# Patient Record
Sex: FEMALE | Race: WHITE | NOT HISPANIC OR LATINO
[De-identification: names, ages, dates, MRNs, and addresses within clinical notes are randomized per-mention and may not be internally consistent; named-entity substitution may affect disease eponyms.]

---

## 2017-06-29 ENCOUNTER — APPOINTMENT (OUTPATIENT)
Dept: OPHTHALMOLOGY | Facility: CLINIC | Age: 67
End: 2017-06-29

## 2017-08-28 ENCOUNTER — APPOINTMENT (OUTPATIENT)
Dept: UROLOGY | Facility: CLINIC | Age: 67
End: 2017-08-28
Payer: MEDICARE

## 2017-08-28 VITALS
HEART RATE: 61 BPM | WEIGHT: 115 LBS | SYSTOLIC BLOOD PRESSURE: 98 MMHG | BODY MASS INDEX: 20.38 KG/M2 | HEIGHT: 63 IN | OXYGEN SATURATION: 91 % | DIASTOLIC BLOOD PRESSURE: 60 MMHG

## 2017-08-28 PROCEDURE — 99213 OFFICE O/P EST LOW 20 MIN: CPT

## 2017-08-28 RX ORDER — KETOCONAZOLE 20 MG/G
2 CREAM TOPICAL
Qty: 60 | Refills: 0 | Status: ACTIVE | COMMUNITY
Start: 2017-05-31

## 2017-08-29 LAB
APPEARANCE: CLEAR
BACTERIA: NEGATIVE
BILIRUBIN URINE: NEGATIVE
BLOOD URINE: NEGATIVE
COLOR: YELLOW
GLUCOSE QUALITATIVE U: NORMAL MG/DL
HYALINE CASTS: 1 /LPF
KETONES URINE: NEGATIVE
LEUKOCYTE ESTERASE URINE: NEGATIVE
MICROSCOPIC-UA: NORMAL
NITRITE URINE: NEGATIVE
PH URINE: 7.5
PROTEIN URINE: NEGATIVE MG/DL
RED BLOOD CELLS URINE: 4 /HPF
SPECIFIC GRAVITY URINE: 1.02
SQUAMOUS EPITHELIAL CELLS: 0 /HPF
UROBILINOGEN URINE: NORMAL MG/DL
WHITE BLOOD CELLS URINE: 1 /HPF

## 2017-11-15 ENCOUNTER — APPOINTMENT (OUTPATIENT)
Dept: CARDIOLOGY | Facility: CLINIC | Age: 67
End: 2017-11-15
Payer: MEDICARE

## 2017-11-15 ENCOUNTER — NON-APPOINTMENT (OUTPATIENT)
Age: 67
End: 2017-11-15

## 2017-11-15 VITALS
HEIGHT: 63 IN | WEIGHT: 117 LBS | DIASTOLIC BLOOD PRESSURE: 69 MMHG | TEMPERATURE: 97.9 F | OXYGEN SATURATION: 99 % | SYSTOLIC BLOOD PRESSURE: 114 MMHG | HEART RATE: 60 BPM | BODY MASS INDEX: 20.73 KG/M2

## 2017-11-15 PROCEDURE — 93000 ELECTROCARDIOGRAM COMPLETE: CPT

## 2017-11-15 PROCEDURE — 99214 OFFICE O/P EST MOD 30 MIN: CPT

## 2017-12-01 ENCOUNTER — OUTPATIENT (OUTPATIENT)
Dept: OUTPATIENT SERVICES | Facility: HOSPITAL | Age: 67
LOS: 1 days | End: 2017-12-01
Payer: MEDICARE

## 2017-12-01 DIAGNOSIS — R55 SYNCOPE AND COLLAPSE: ICD-10-CM

## 2017-12-01 PROCEDURE — 93660 TILT TABLE EVALUATION: CPT

## 2017-12-01 PROCEDURE — 93660 TILT TABLE EVALUATION: CPT | Mod: 26

## 2018-06-12 ENCOUNTER — APPOINTMENT (OUTPATIENT)
Dept: OPHTHALMOLOGY | Facility: CLINIC | Age: 68
End: 2018-06-12
Payer: MEDICARE

## 2018-06-12 DIAGNOSIS — D68.61 ANTIPHOSPHOLIPID SYNDROME: ICD-10-CM

## 2018-06-12 PROCEDURE — 92014 COMPRE OPH EXAM EST PT 1/>: CPT

## 2018-06-12 PROCEDURE — 92015 DETERMINE REFRACTIVE STATE: CPT

## 2018-07-05 ENCOUNTER — APPOINTMENT (OUTPATIENT)
Dept: UROLOGY | Facility: CLINIC | Age: 68
End: 2018-07-05
Payer: MEDICARE

## 2018-07-05 VITALS
WEIGHT: 114 LBS | BODY MASS INDEX: 20.98 KG/M2 | HEART RATE: 63 BPM | SYSTOLIC BLOOD PRESSURE: 108 MMHG | DIASTOLIC BLOOD PRESSURE: 68 MMHG | HEIGHT: 62 IN | OXYGEN SATURATION: 98 % | TEMPERATURE: 97.7 F

## 2018-07-05 PROCEDURE — 99213 OFFICE O/P EST LOW 20 MIN: CPT

## 2018-07-05 RX ORDER — FLUTICASONE PROPIONATE 50 UG/1
50 SPRAY, METERED NASAL
Qty: 16 | Refills: 0 | Status: DISCONTINUED | COMMUNITY
Start: 2018-02-28 | End: 2018-07-05

## 2018-07-05 RX ORDER — NEOMYCIN AND POLYMYXIN B SULFATES AND DEXAMETHASONE 3.5; 10000; 1 MG/G; [IU]/G; MG/G
3.5-10000-0.1 OINTMENT OPHTHALMIC
Qty: 4 | Refills: 0 | Status: DISCONTINUED | COMMUNITY
Start: 2018-04-22 | End: 2018-07-05

## 2018-07-05 RX ORDER — SUCRALFATE 1 G/10ML
1 SUSPENSION ORAL
Qty: 600 | Refills: 0 | Status: DISCONTINUED | COMMUNITY
Start: 2017-08-03 | End: 2018-07-05

## 2018-07-05 RX ORDER — OMEPRAZOLE 40 MG/1
40 CAPSULE, DELAYED RELEASE ORAL
Qty: 90 | Refills: 0 | Status: DISCONTINUED | COMMUNITY
Start: 2017-08-03 | End: 2018-07-05

## 2018-07-05 RX ORDER — AMOXICILLIN 500 MG/1
500 CAPSULE ORAL
Qty: 16 | Refills: 0 | Status: DISCONTINUED | COMMUNITY
Start: 2018-05-03 | End: 2018-07-05

## 2018-07-05 RX ORDER — BENZONATATE 200 MG/1
200 CAPSULE ORAL
Qty: 30 | Refills: 0 | Status: DISCONTINUED | COMMUNITY
Start: 2018-02-28 | End: 2018-07-05

## 2018-11-21 ENCOUNTER — APPOINTMENT (OUTPATIENT)
Dept: UROLOGY | Facility: CLINIC | Age: 68
End: 2018-11-21
Payer: MEDICARE

## 2018-11-21 VITALS
BODY MASS INDEX: 20.38 KG/M2 | RESPIRATION RATE: 16 BRPM | HEIGHT: 63 IN | HEART RATE: 55 BPM | WEIGHT: 115 LBS | DIASTOLIC BLOOD PRESSURE: 60 MMHG | OXYGEN SATURATION: 99 % | SYSTOLIC BLOOD PRESSURE: 112 MMHG

## 2018-11-21 LAB
BILIRUB UR QL STRIP: NORMAL
CLARITY UR: NORMAL
COLLECTION METHOD: NORMAL
GLUCOSE UR-MCNC: NORMAL
HCG UR QL: 0.2 EU/DL
HGB UR QL STRIP.AUTO: NORMAL
KETONES UR-MCNC: NORMAL
LEUKOCYTE ESTERASE UR QL STRIP: NORMAL
NITRITE UR QL STRIP: NORMAL
PH UR STRIP: 8.5
PROT UR STRIP-MCNC: NORMAL
SP GR UR STRIP: 1.02

## 2018-11-21 PROCEDURE — 99213 OFFICE O/P EST LOW 20 MIN: CPT | Mod: 25

## 2018-11-21 PROCEDURE — 81003 URINALYSIS AUTO W/O SCOPE: CPT | Mod: QW

## 2018-11-23 LAB — BACTERIA UR CULT: NORMAL

## 2019-01-09 ENCOUNTER — APPOINTMENT (OUTPATIENT)
Dept: CARDIOLOGY | Facility: CLINIC | Age: 69
End: 2019-01-09
Payer: MEDICARE

## 2019-01-09 ENCOUNTER — NON-APPOINTMENT (OUTPATIENT)
Age: 69
End: 2019-01-09

## 2019-01-09 VITALS
BODY MASS INDEX: 20.55 KG/M2 | WEIGHT: 116 LBS | SYSTOLIC BLOOD PRESSURE: 112 MMHG | HEART RATE: 57 BPM | OXYGEN SATURATION: 100 % | DIASTOLIC BLOOD PRESSURE: 58 MMHG

## 2019-01-09 PROCEDURE — 93000 ELECTROCARDIOGRAM COMPLETE: CPT

## 2019-01-09 PROCEDURE — 99214 OFFICE O/P EST MOD 30 MIN: CPT

## 2019-01-09 NOTE — HISTORY OF PRESENT ILLNESS
[FreeTextEntry1] : 68-year-old female with a history of palpitations and syncopal episodes of undetermined cause. She has no evidence of structural heart disease.  She is here because she notes an increase her symptoms of lightheadedness and heart racing when she stands up. The feeling usually persists for about 30 seconds and she has not passed out. She continues physically active and has no trouble at the gym, although she notes some dyspnea while climbing stairs.

## 2019-01-09 NOTE — DISCUSSION/SUMMARY
[FreeTextEntry1] : Mrs. Atkins is noting symptoms consistent with orthostatic hypotension and her systolic blood pressure fell from about 110 while sitting to 95 when she stood up and she became headed. Her heart rate did not change. Her exam was otherwise normal and an EKG is within normal limits.\par \par She's always had low blood pressure and her symptoms are apparently due to orthostatic hypotension. She will try to increase her salt and water intake to see if it improves her symptoms and call back here in a week.

## 2019-01-09 NOTE — PHYSICAL EXAM
[General Appearance - Well Developed] : well developed [Normal Appearance] : normal appearance [Well Groomed] : well groomed [General Appearance - Well Nourished] : well nourished [No Deformities] : no deformities [General Appearance - In No Acute Distress] : no acute distress [Normal Conjunctiva] : the conjunctiva exhibited no abnormalities [Eyelids - No Xanthelasma] : the eyelids demonstrated no xanthelasmas [Normal Oral Mucosa] : normal oral mucosa [No Oral Pallor] : no oral pallor [No Oral Cyanosis] : no oral cyanosis [Normal Jugular Venous A Waves Present] : normal jugular venous A waves present [Normal Jugular Venous V Waves Present] : normal jugular venous V waves present [No Jugular Venous Mcclellan A Waves] : no jugular venous mcclellan A waves [Respiration, Rhythm And Depth] : normal respiratory rhythm and effort [Exaggerated Use Of Accessory Muscles For Inspiration] : no accessory muscle use [Auscultation Breath Sounds / Voice Sounds] : lungs were clear to auscultation bilaterally [Heart Rate And Rhythm] : heart rate and rhythm were normal [Heart Sounds] : normal S1 and S2 [Edema] : no peripheral edema present [Abdomen Soft] : soft [Abdomen Tenderness] : non-tender [Abdomen Mass (___ Cm)] : no abdominal mass palpated [Abnormal Walk] : normal gait [Gait - Sufficient For Exercise Testing] : the gait was sufficient for exercise testing [Nail Clubbing] : no clubbing of the fingernails [Cyanosis, Localized] : no localized cyanosis [Petechial Hemorrhages (___cm)] : no petechial hemorrhages [Skin Color & Pigmentation] : normal skin color and pigmentation [] : no rash [No Venous Stasis] : no venous stasis [Skin Lesions] : no skin lesions [No Skin Ulcers] : no skin ulcer [No Xanthoma] : no  xanthoma was observed [Oriented To Time, Place, And Person] : oriented to person, place, and time [Affect] : the affect was normal [Mood] : the mood was normal [No Anxiety] : not feeling anxious [FreeTextEntry1] : Late systolic murmur at left sternal border and apex

## 2019-01-09 NOTE — REVIEW OF SYSTEMS
[Feeling Fatigued] : feeling fatigued [Palpitations] : palpitations [Joint Pain] : joint pain [Negative] : Heme/Lymph

## 2019-07-03 ENCOUNTER — APPOINTMENT (OUTPATIENT)
Dept: UROLOGY | Facility: CLINIC | Age: 69
End: 2019-07-03
Payer: MEDICARE

## 2019-07-03 VITALS
RESPIRATION RATE: 12 BRPM | OXYGEN SATURATION: 96 % | BODY MASS INDEX: 20.55 KG/M2 | WEIGHT: 116 LBS | SYSTOLIC BLOOD PRESSURE: 116 MMHG | HEART RATE: 57 BPM | HEIGHT: 63 IN | DIASTOLIC BLOOD PRESSURE: 72 MMHG

## 2019-07-03 PROCEDURE — 99213 OFFICE O/P EST LOW 20 MIN: CPT

## 2019-07-03 NOTE — ASSESSMENT
[FreeTextEntry1] : On physical exam, urethral mucosal prolapse looks unchanged. She could consider surgical excision once again in the future. Questions and concerns were answered. She can followup in 6 months to one year.

## 2019-07-03 NOTE — HISTORY OF PRESENT ILLNESS
[FreeTextEntry1] : She is a 68-year-old woman who is seen today in follow-up for urethral prolapse. She has not had any discomfort recently. However she seems to be irritated by Vagifem given to her by gynecologist. There is no change in urinary symptoms. There has been no bleeding or spotting.\par Previous note: after using estrogen replacement, she noted a significant difference especially from a sexual activity standpoint. She underwent excision of urethral prolapse in 11/2012. Pathology showed granulation tissue. Nocturia is 1-3 times depending on how much she drinks.

## 2019-07-03 NOTE — PHYSICAL EXAM
[General Appearance - Well Nourished] : well nourished [General Appearance - Well Developed] : well developed [Well Groomed] : well groomed [General Appearance - In No Acute Distress] : no acute distress [Normal Appearance] : normal appearance [Abdomen Tenderness] : non-tender [Abdomen Soft] : soft [Costovertebral Angle Tenderness] : no ~M costovertebral angle tenderness [Urinary Bladder Findings] : the bladder was normal on palpation [FreeTextEntry1] : MA in room, urethral prolapse at 6 o'clock position, unchanged. [Exaggerated Use Of Accessory Muscles For Inspiration] : no accessory muscle use [Respiration, Rhythm And Depth] : normal respiratory rhythm and effort [] : no respiratory distress [Affect] : the affect was normal [Oriented To Time, Place, And Person] : oriented to person, place, and time [Not Anxious] : not anxious [Mood] : the mood was normal

## 2019-09-05 ENCOUNTER — NON-APPOINTMENT (OUTPATIENT)
Age: 69
End: 2019-09-05

## 2019-09-05 ENCOUNTER — APPOINTMENT (OUTPATIENT)
Dept: OPHTHALMOLOGY | Facility: CLINIC | Age: 69
End: 2019-09-05
Payer: MEDICARE

## 2019-09-05 PROCEDURE — 92014 COMPRE OPH EXAM EST PT 1/>: CPT

## 2020-08-12 ENCOUNTER — APPOINTMENT (OUTPATIENT)
Dept: UROLOGY | Facility: CLINIC | Age: 70
End: 2020-08-12
Payer: MEDICARE

## 2020-08-12 VITALS
BODY MASS INDEX: 20.38 KG/M2 | DIASTOLIC BLOOD PRESSURE: 60 MMHG | WEIGHT: 115 LBS | SYSTOLIC BLOOD PRESSURE: 106 MMHG | HEIGHT: 63 IN | TEMPERATURE: 98.1 F

## 2020-08-12 PROCEDURE — 99213 OFFICE O/P EST LOW 20 MIN: CPT

## 2020-08-12 NOTE — ASSESSMENT
[FreeTextEntry1] : Physical examination is unchanged. Observation versus repeat excision was discussed. Urine studies will be sent. She will continue monitoring for now and followup in 6 months to one year.

## 2020-08-12 NOTE — PHYSICAL EXAM
[General Appearance - Well Developed] : well developed [General Appearance - Well Nourished] : well nourished [Normal Appearance] : normal appearance [Well Groomed] : well groomed [General Appearance - In No Acute Distress] : no acute distress [Abdomen Soft] : soft [Abdomen Tenderness] : non-tender [Costovertebral Angle Tenderness] : no ~M costovertebral angle tenderness [Urinary Bladder Findings] : the bladder was normal on palpation [FreeTextEntry1] : RN in room, urethral prolapse at 6 o'clock position, no erythema, no bleeding. [] : no respiratory distress [Respiration, Rhythm And Depth] : normal respiratory rhythm and effort [Exaggerated Use Of Accessory Muscles For Inspiration] : no accessory muscle use [Oriented To Time, Place, And Person] : oriented to person, place, and time [Affect] : the affect was normal [Mood] : the mood was normal [Not Anxious] : not anxious

## 2020-08-12 NOTE — HISTORY OF PRESENT ILLNESS
[FreeTextEntry1] : She is a 70-year-old woman who is seen today in follow-up for urethral prolapse. Sometimes she has slight dysuria. There has been no spotting of blood or other significant symptoms. She is on Vagifem.\par \par Previous note: She underwent excision of urethral prolapse in 11/2012. Pathology showed granulation tissue. Nocturia is 1-3 times depending on how much she drinks.

## 2020-08-13 LAB
APPEARANCE: ABNORMAL
BACTERIA: NEGATIVE
BILIRUBIN URINE: NEGATIVE
BLOOD URINE: NEGATIVE
COLOR: YELLOW
GLUCOSE QUALITATIVE U: NEGATIVE
HYALINE CASTS: 0 /LPF
KETONES URINE: NEGATIVE
LEUKOCYTE ESTERASE URINE: NEGATIVE
MICROSCOPIC-UA: NORMAL
NITRITE URINE: NEGATIVE
PH URINE: 8
PROTEIN URINE: NORMAL
RED BLOOD CELLS URINE: 3 /HPF
SPECIFIC GRAVITY URINE: 1.02
SQUAMOUS EPITHELIAL CELLS: 3 /HPF
UROBILINOGEN URINE: NORMAL
WHITE BLOOD CELLS URINE: 2 /HPF

## 2020-08-14 LAB — BACTERIA UR CULT: NORMAL

## 2020-08-19 ENCOUNTER — APPOINTMENT (OUTPATIENT)
Dept: CARDIOLOGY | Facility: CLINIC | Age: 70
End: 2020-08-19
Payer: MEDICARE

## 2020-08-19 ENCOUNTER — NON-APPOINTMENT (OUTPATIENT)
Age: 70
End: 2020-08-19

## 2020-08-19 VITALS
OXYGEN SATURATION: 99 % | HEART RATE: 56 BPM | WEIGHT: 116 LBS | BODY MASS INDEX: 20.55 KG/M2 | SYSTOLIC BLOOD PRESSURE: 113 MMHG | DIASTOLIC BLOOD PRESSURE: 64 MMHG | TEMPERATURE: 97.9 F | HEIGHT: 63 IN

## 2020-08-19 PROCEDURE — 93000 ELECTROCARDIOGRAM COMPLETE: CPT

## 2020-08-19 PROCEDURE — 99214 OFFICE O/P EST MOD 30 MIN: CPT

## 2020-08-19 NOTE — DISCUSSION/SUMMARY
[FreeTextEntry1] : Ms Atkins has no complaints and looks unchanged.  Her exam shows regular rhythm, low normal blood pressure, clear lungs, and I could not hear murmur today.  Her EKG shows left axis deviation which is an old finding and prominent P waves which have also been present in the past.\par \par I told her there was no real change on her EKG and that she was doing well.  She will follow-up in a year

## 2020-08-19 NOTE — REVIEW OF SYSTEMS
[Joint Pain] : joint pain [Palpitations] : palpitations [Feeling Fatigued] : feeling fatigued [Negative] : Heme/Lymph

## 2020-08-19 NOTE — PHYSICAL EXAM
[General Appearance - Well Developed] : well developed [Normal Appearance] : normal appearance [Well Groomed] : well groomed [General Appearance - Well Nourished] : well nourished [No Deformities] : no deformities [Normal Conjunctiva] : the conjunctiva exhibited no abnormalities [General Appearance - In No Acute Distress] : no acute distress [Eyelids - No Xanthelasma] : the eyelids demonstrated no xanthelasmas [Normal Oral Mucosa] : normal oral mucosa [No Oral Pallor] : no oral pallor [Normal Jugular Venous A Waves Present] : normal jugular venous A waves present [No Oral Cyanosis] : no oral cyanosis [No Jugular Venous Mcclellan A Waves] : no jugular venous mcclellan A waves [Normal Jugular Venous V Waves Present] : normal jugular venous V waves present [Respiration, Rhythm And Depth] : normal respiratory rhythm and effort [Exaggerated Use Of Accessory Muscles For Inspiration] : no accessory muscle use [Auscultation Breath Sounds / Voice Sounds] : lungs were clear to auscultation bilaterally [Heart Rate And Rhythm] : heart rate and rhythm were normal [Heart Sounds] : normal S1 and S2 [Edema] : no peripheral edema present [Abdomen Soft] : soft [Abdomen Tenderness] : non-tender [Abdomen Mass (___ Cm)] : no abdominal mass palpated [Abnormal Walk] : normal gait [Gait - Sufficient For Exercise Testing] : the gait was sufficient for exercise testing [Nail Clubbing] : no clubbing of the fingernails [Petechial Hemorrhages (___cm)] : no petechial hemorrhages [Cyanosis, Localized] : no localized cyanosis [Skin Color & Pigmentation] : normal skin color and pigmentation [] : no rash [No Venous Stasis] : no venous stasis [Skin Lesions] : no skin lesions [No Skin Ulcers] : no skin ulcer [No Xanthoma] : no  xanthoma was observed [Oriented To Time, Place, And Person] : oriented to person, place, and time [Affect] : the affect was normal [Mood] : the mood was normal [No Anxiety] : not feeling anxious

## 2020-08-19 NOTE — HISTORY OF PRESENT ILLNESS
[FreeTextEntry1] : 70-year old female with a history of vasodepressor syncope and palpitations.  She returns for follow-up after her internist told her EKG was abnormal because of left axis deviation and right atrial enlargement.  She has no symptoms and is feeling generally well.

## 2020-09-08 ENCOUNTER — APPOINTMENT (OUTPATIENT)
Dept: OPHTHALMOLOGY | Facility: CLINIC | Age: 70
End: 2020-09-08
Payer: MEDICARE

## 2020-09-08 ENCOUNTER — NON-APPOINTMENT (OUTPATIENT)
Age: 70
End: 2020-09-08

## 2020-09-08 PROCEDURE — 92014 COMPRE OPH EXAM EST PT 1/>: CPT

## 2020-10-01 ENCOUNTER — RECORD ABSTRACTING (OUTPATIENT)
Age: 70
End: 2020-10-01

## 2020-10-01 RX ORDER — OMEPRAZOLE 20 MG/1
20 CAPSULE, DELAYED RELEASE ORAL
Refills: 0 | Status: ACTIVE | COMMUNITY

## 2020-10-01 RX ORDER — ASPIRIN 81 MG/1
81 TABLET, CHEWABLE ORAL
Refills: 0 | Status: ACTIVE | COMMUNITY

## 2020-10-01 RX ORDER — ESTRADIOL 10 UG/1
10 TABLET, FILM COATED VAGINAL
Refills: 0 | Status: ACTIVE | COMMUNITY

## 2020-10-05 ENCOUNTER — APPOINTMENT (OUTPATIENT)
Dept: OTOLARYNGOLOGY | Facility: CLINIC | Age: 70
End: 2020-10-05
Payer: MEDICARE

## 2020-10-05 VITALS
SYSTOLIC BLOOD PRESSURE: 110 MMHG | WEIGHT: 115 LBS | BODY MASS INDEX: 20.38 KG/M2 | DIASTOLIC BLOOD PRESSURE: 68 MMHG | TEMPERATURE: 98.3 F | HEIGHT: 63 IN | HEART RATE: 58 BPM

## 2020-10-05 PROCEDURE — 99213 OFFICE O/P EST LOW 20 MIN: CPT

## 2020-10-05 NOTE — REVIEW OF SYSTEMS
[Itching] : itching [Seasonal Allergies] : seasonal allergies [As Noted in HPI] : as noted in HPI [Negative] : Head and Neck [FreeTextEntry9] : arthritis [FreeTextEntry1] : passing out

## 2020-10-05 NOTE — HISTORY OF PRESENT ILLNESS
[de-identified] : 70 yr old female w discomfort AD.  Similar problem in the past twice, was due to dental caries requiring root canal then extraction then implant. Dental work was completed last month. Feels better this week. \par -tinnitus, dizzy\par -hx otitis, noise exp, head trauma\par +FH father unilat deafness, then presby\par +hx TMJ, stopped gum chewing and went to PT, no more TMJ pain

## 2021-03-17 ENCOUNTER — APPOINTMENT (OUTPATIENT)
Dept: UROLOGY | Facility: CLINIC | Age: 71
End: 2021-03-17
Payer: MEDICARE

## 2021-03-17 VITALS
OXYGEN SATURATION: 99 % | WEIGHT: 115 LBS | HEIGHT: 63 IN | DIASTOLIC BLOOD PRESSURE: 71 MMHG | SYSTOLIC BLOOD PRESSURE: 113 MMHG | BODY MASS INDEX: 20.38 KG/M2 | RESPIRATION RATE: 15 BRPM | TEMPERATURE: 98.3 F | HEART RATE: 58 BPM

## 2021-03-17 PROCEDURE — 99213 OFFICE O/P EST LOW 20 MIN: CPT

## 2021-03-17 NOTE — HISTORY OF PRESENT ILLNESS
[FreeTextEntry1] : She is a 70-year-old woman who is seen today in follow-up for urethral mucosal prolapse. She is on Vagifem.  Every now and then she has slight burning after urination.  Urinalysis and urine culture were negative in August 2020.  There is no hematuria or dysuria.\par \par Previous note: She underwent excision of urethral prolapse in 11/2012. Pathology showed granulation tissue. Nocturia is 1-3 times depending on how much she drinks.

## 2021-03-17 NOTE — ASSESSMENT
[FreeTextEntry1] : Pelvic examination with urethral mucosal prolapse is unchanged from previous visit.  There is no evidence of bleeding or spotting upon examination.  Sometimes she has irritation after urination.  She is on Vagifem already.  She could continue to monitor or consider removal of it in the future again.  Risk of recurrence was discussed.

## 2021-03-17 NOTE — PHYSICAL EXAM
[General Appearance - Well Developed] : well developed [General Appearance - Well Nourished] : well nourished [Normal Appearance] : normal appearance [Well Groomed] : well groomed [General Appearance - In No Acute Distress] : no acute distress [Abdomen Soft] : soft [Abdomen Tenderness] : non-tender [Costovertebral Angle Tenderness] : no ~M costovertebral angle tenderness [Urinary Bladder Findings] : the bladder was normal on palpation [FreeTextEntry1] : MA in room, urethral prolapse at 6 o'clock position unchanged.

## 2021-03-22 ENCOUNTER — APPOINTMENT (OUTPATIENT)
Dept: SURGICAL ONCOLOGY | Facility: CLINIC | Age: 71
End: 2021-03-22
Payer: MEDICARE

## 2021-03-22 VITALS
OXYGEN SATURATION: 98 % | HEART RATE: 75 BPM | BODY MASS INDEX: 20.02 KG/M2 | SYSTOLIC BLOOD PRESSURE: 109 MMHG | WEIGHT: 113 LBS | DIASTOLIC BLOOD PRESSURE: 69 MMHG

## 2021-03-22 PROCEDURE — 99205 OFFICE O/P NEW HI 60 MIN: CPT

## 2021-03-22 NOTE — ASSESSMENT
[FreeTextEntry1] : IMP:\par 6 x 5.5 cm nonspecific large cystic lesion in the posterior right pelvis ventral to the sacrum resulting in mass effect upon the adjacent bowel with a few thin internal septations but no locally aggressive MRI characteristics. No adenopathy. \par \par \par \par PLAN:\par CT A/P \par Possible drainage of cyst

## 2021-03-22 NOTE — CONSULT LETTER
[Dear  ___] : Dear  [unfilled], [Consult Letter:] : I had the pleasure of evaluating your patient, [unfilled]. [Please see my note below.] : Please see my note below. [Consult Closing:] : Thank you very much for allowing me to participate in the care of this patient.  If you have any questions, please do not hesitate to contact me. [Sincerely,] : Sincerely, [FreeTextEntry1] : I will keep you informed of the CT results and my subsequent plans. [FreeTextEntry3] : Leobrado Mcconnell MD FACS\par Chief of Surgical Oncology\par \par

## 2021-03-22 NOTE — PHYSICAL EXAM
[Normal] : supple, no neck mass and thyroid not enlarged [Normal Groin Lymph Nodes] : normal groin lymph nodes [Normal] : oriented to person, place and time, with appropriate affect [Normal Supraclavicular Lymph Nodes] : normal supraclavicular lymph nodes [de-identified] : no masses [de-identified] : pelvic exam: Cervix still present with right pelvic fullness but no distinct mass

## 2021-03-22 NOTE — HISTORY OF PRESENT ILLNESS
[de-identified] : Ms. EDUARDO UNGER  is a 70 year  old female  presenting for an initial consultation for evaluation of a pelvic cyst, referred by Dr. William Blair. \par \par The patient has a history of arthritis, herniated discs, spinal stenosis and has been seeing Dr. Blair for many years.  She was recently experiencing lower back pain, worse with prolonged sitting, and sought care with Dr. Blair. Pt. states she underwent an MRI of the lumbar spine ~2-3 weeks ago which showed a partially visualized adnexal mass.  She was then referred for a Pelvic MRI which she completed on 3/1/2021 revealing a 6 x 5.5 cm nonspecific large cystic lesion in the posterior right pelvis ventral to the sacrum resulting in mass effect upon the adjacent bowel with a few thin internal septations but no locally aggressive MRI characteristics. No adenopathy. \par \par Pt. is with c/o ongoing intermittent lower back pain.  Denies abdominal or pelvic pain, nausea, vomiting, changes in bowel or urinary habits. Denies constitutional symptoms.  \par \par Past medical history notable for arthritis, anticardiolipin syndrome, right lattice degeneration, lupus anticoagulation disorder, orthostatic hypotension, palpitations, urethral prolapse (on Vagifem).  Past surgical history notable for , tonsillectomy, hysterectomy and shoulder surgery.   Pt. remains on ASA 81 mg daily. \par

## 2021-03-24 ENCOUNTER — RESULT REVIEW (OUTPATIENT)
Age: 71
End: 2021-03-24

## 2021-04-05 ENCOUNTER — APPOINTMENT (OUTPATIENT)
Dept: CT IMAGING | Facility: CLINIC | Age: 71
End: 2021-04-05
Payer: MEDICARE

## 2021-04-05 ENCOUNTER — OUTPATIENT (OUTPATIENT)
Dept: OUTPATIENT SERVICES | Facility: HOSPITAL | Age: 71
LOS: 1 days | End: 2021-04-05
Payer: MEDICARE

## 2021-04-05 ENCOUNTER — RESULT REVIEW (OUTPATIENT)
Age: 71
End: 2021-04-05

## 2021-04-05 DIAGNOSIS — R19.00 INTRA-ABDOMINAL AND PELVIC SWELLING, MASS AND LUMP, UNSPECIFIED SITE: ICD-10-CM

## 2021-04-05 PROCEDURE — 74177 CT ABD & PELVIS W/CONTRAST: CPT | Mod: 26,MG

## 2021-04-05 PROCEDURE — 74177 CT ABD & PELVIS W/CONTRAST: CPT

## 2021-04-05 PROCEDURE — G1004: CPT

## 2021-04-18 ENCOUNTER — OUTPATIENT (OUTPATIENT)
Dept: OUTPATIENT SERVICES | Facility: HOSPITAL | Age: 71
LOS: 1 days | End: 2021-04-18
Payer: MEDICARE

## 2021-04-18 DIAGNOSIS — Z11.52 ENCOUNTER FOR SCREENING FOR COVID-19: ICD-10-CM

## 2021-04-18 LAB — SARS-COV-2 RNA SPEC QL NAA+PROBE: SIGNIFICANT CHANGE UP

## 2021-04-18 PROCEDURE — U0003: CPT

## 2021-04-18 PROCEDURE — U0005: CPT

## 2021-04-18 PROCEDURE — C9803: CPT

## 2021-04-20 LAB
BASOPHILS # BLD AUTO: 0.07 K/UL
BASOPHILS NFR BLD AUTO: 1.4 %
BUN SERPL-MCNC: 24 MG/DL
CREAT SERPL-MCNC: 0.67 MG/DL
EOSINOPHIL # BLD AUTO: 0.3 K/UL
EOSINOPHIL NFR BLD AUTO: 6.2 %
HCT VFR BLD CALC: 39.8 %
HGB BLD-MCNC: 12.7 G/DL
IMM GRANULOCYTES NFR BLD AUTO: 0.4 %
INR PPP: 1.02 RATIO
LYMPHOCYTES # BLD AUTO: 1.46 K/UL
LYMPHOCYTES NFR BLD AUTO: 30.1 %
MAN DIFF?: NORMAL
MCHC RBC-ENTMCNC: 30.2 PG
MCHC RBC-ENTMCNC: 31.9 GM/DL
MCV RBC AUTO: 94.8 FL
MONOCYTES # BLD AUTO: 0.43 K/UL
MONOCYTES NFR BLD AUTO: 8.9 %
NEUTROPHILS # BLD AUTO: 2.57 K/UL
NEUTROPHILS NFR BLD AUTO: 53 %
PLATELET # BLD AUTO: 278 K/UL
PT BLD: 12.2 SEC
RBC # BLD: 4.2 M/UL
RBC # FLD: 13.2 %
WBC # FLD AUTO: 4.85 K/UL

## 2021-04-21 ENCOUNTER — OUTPATIENT (OUTPATIENT)
Dept: OUTPATIENT SERVICES | Facility: HOSPITAL | Age: 71
LOS: 1 days | End: 2021-04-21
Payer: MEDICARE

## 2021-04-21 ENCOUNTER — RESULT REVIEW (OUTPATIENT)
Age: 71
End: 2021-04-21

## 2021-04-21 VITALS
WEIGHT: 113.98 LBS | TEMPERATURE: 98 F | SYSTOLIC BLOOD PRESSURE: 115 MMHG | DIASTOLIC BLOOD PRESSURE: 77 MMHG | HEIGHT: 63 IN | OXYGEN SATURATION: 99 % | RESPIRATION RATE: 18 BRPM | HEART RATE: 64 BPM

## 2021-04-21 VITALS
HEART RATE: 62 BPM | SYSTOLIC BLOOD PRESSURE: 95 MMHG | TEMPERATURE: 98 F | RESPIRATION RATE: 18 BRPM | OXYGEN SATURATION: 98 % | DIASTOLIC BLOOD PRESSURE: 60 MMHG

## 2021-04-21 DIAGNOSIS — M19.012 PRIMARY OSTEOARTHRITIS, LEFT SHOULDER: Chronic | ICD-10-CM

## 2021-04-21 DIAGNOSIS — R19.00 INTRA-ABDOMINAL AND PELVIC SWELLING, MASS AND LUMP, UNSPECIFIED SITE: ICD-10-CM

## 2021-04-21 LAB
GRAM STN FLD: SIGNIFICANT CHANGE UP
SPECIMEN SOURCE: SIGNIFICANT CHANGE UP

## 2021-04-21 PROCEDURE — 87205 SMEAR GRAM STAIN: CPT

## 2021-04-21 PROCEDURE — C1729: CPT

## 2021-04-21 PROCEDURE — 77012 CT SCAN FOR NEEDLE BIOPSY: CPT

## 2021-04-21 PROCEDURE — 10160 PNXR ASPIR ABSC HMTMA BULLA: CPT

## 2021-04-21 PROCEDURE — 87075 CULTR BACTERIA EXCEPT BLOOD: CPT

## 2021-04-21 PROCEDURE — 87070 CULTURE OTHR SPECIMN AEROBIC: CPT

## 2021-04-21 PROCEDURE — 77012 CT SCAN FOR NEEDLE BIOPSY: CPT | Mod: 26

## 2021-04-21 PROCEDURE — 88173 CYTOPATH EVAL FNA REPORT: CPT

## 2021-04-21 PROCEDURE — 88173 CYTOPATH EVAL FNA REPORT: CPT | Mod: 26

## 2021-04-21 NOTE — ASU DISCHARGE PLAN (ADULT/PEDIATRIC) - NURSING INSTRUCTIONS
Please feel free to contact us at [phone] if any problems arise. After 6PM, Monday through Friday, on weekends and on holidays, please call [phone] and ask for the radiology resident on call to be paged.

## 2021-04-21 NOTE — ASU DISCHARGE PLAN (ADULT/PEDIATRIC) - "IF YOU OR YOUR GUARDIAN/FAMILY IS A SMOKER, IT IS IMPORTANT FOR YOUR HEALTH TO STOP SMOKING. PLEASE BE AWARE THAT SECOND HAND SMOKE IS ALSO HARMFUL."
Patient has appointment scheduled for 11/22/19.  Called and spoke with patient and stated this can be written at her appt.  No further questions.     Ariadna Woods RN   Statement Selected

## 2021-04-21 NOTE — ASU PATIENT PROFILE, ADULT - PSH
H/O  section  x 2 ,   H/O hysterectomy for benign disease  2002  Osteoarthritis of left shoulder    S/P D&C  many yrs ago  S/P tonsillectomy  60yrs ago

## 2021-04-21 NOTE — ASU DISCHARGE PLAN (ADULT/PEDIATRIC) - ASU DC SPECIAL INSTRUCTIONSFT
Pelvic Cyst Aspiration    Discharge Instructions  - You have had an aspiration of a pelvic cyst  - You may shower in 24 hours. No soaking or swimming until the site is completely healed.  - Keep the area covered and dry for the next 24 hours.  - Do not perform any heavy lifting for the next few days or until the site is healed.  - You may resume your normal diet.  - You may resume your normal medications however you should wait 48 hours before restarting aspirin, plavix, or blood thinners.  - It is normal to experience some pain over the site for the next few days. You may take apply ice to the area (20 minutes on, 20 minutes off) and take Tylenol for that pain. Do not take more frequently than every 6 hours and do not exceed more than 3000mg of Tylenol in a 24 hour period.    - You were given conscious sedation which may make you drowsy, therefore you need someone to stay with you until the morning following the procedure.  - Do not drive, engage in heavy lifting or strenuous activity, or drink any alcoholic beverages for the next 24 hours.   - You may resume normal activity in 24 hours.    Notify your primary physician and/or Interventional Radiology IMMEDIATELY if you experience any of the following       - Fever of 101F or 38C       - Chills or Rigors/ Shakes       - Swelling and/or Redness in the area around the biopsy site       - Worsening Pain       - Blood soaked bandages or worsening bleeding       - Lightheadedness and/or dizziness upon standing       - Chest Pain/ Tightness       - Shortness of Breath       - Difficulty walking    If you have a problem that you believe requires IMMEDIATE attention, please go to your NEAREST Emergency Room. If you believe your problem can safely wait until you speak to a physician, please call Interventional Radiology at [phone] for any concerns.    During Normal Weekday Business Hours- You can contact the Interventional Radiology department at [phone] during normal business hours via telephone.  During Evenings and Weekends- If you need to contact Interventional Radiology during off hours, do so by calling the hospital at [phone] and request to be connected to the Interventional Radiology Resident on call.

## 2021-04-21 NOTE — ASU PATIENT PROFILE, ADULT - PMH
Anticardiolipin antibody positive  12yrs ago  Arthritis  x many yrs  Basal cell carcinoma of face  11/12  Lupus anticoagulant positive  12yrs ago  Migraine  15yrs  Uterine leiomyoma  10yrs ago

## 2021-04-21 NOTE — PRE PROCEDURE NOTE - PRE PROCEDURE EVALUATION
Interventional Radiology    HPI: 70y Female with hx of positive lupus anticoagulant, anticardiolipin antibody positive, migraines, who is referred by Dr Mcconnell after recent CT revealed pelvic cyst. She presents for CT guided pelvic cyst aspiration. Pt denies fever but reports lower back pain.    Allergies: grass, molds, dust (Sneezing; Eye Irritation)      PAST MEDICAL & SURGICAL HISTORY:  Lupus anticoagulant positive  12yrs ago    Anticardiolipin antibody positive  12yrs ago    Uterine leiomyoma  10yrs ago    Basal cell carcinoma of face  11    Migraine  15yrs    Arthritis  x many yrs    S/P tonsillectomy  60yrs ago    H/O hysterectomy for benign disease  2002    S/P D&C  many yrs ago    H/O  section  x 2 ,     Osteoarthritis of left shoulder      Allergies    Drug Allergies Not Recorded  grass, molds, dust (Sneezing; Eye Irritation)    Intolerances        Medications (Abx/Cardiac/Anticoagulation/Blood Products)  Home Medications:  Aspirin Enteric Coated 81 mg oral delayed release tablet: 1 tab(s) orally 4 times a week (2021 10:13)  omeprazole 20 mg oral delayed release capsule: 1 cap(s) orally once a day (2021 10:13)  Pepcid:  (2021 10:13)  Vagifem: vaginal 3 times a week (2021 10:13)      Data:  160  51.7  T(C): 36.9  HR: 64  BP: 115/77  RR: 18  SpO2: 99%    Exam  General: No acute distress  Chest: Non labored breathing  Abdomen: Non-distended  Extremities: No swelling, warm            Pertinent labs:  21 wbc 4.85, plt 278, inr 1.02, cr .67, gfr 88    COVID-19 PCR . (21 @ 16:20)    COVID-19 PCR: NotDetec: You can help in the fight against COVID-19. [employer] may contact  you to see if you are interested in voluntarily participating in one of  our clinical trials.  Testing is performed using polymerase chain reaction (PCR) or  transcription mediated amplification (TMA). This COVID-19 (SARS-CoV-2)  nucleic acid amplification test was validated by [employer] and is  in use under the FDA Emergency Use Authorization (EUA) for clinical labs  CLIA-certified to perform high complexity testing. Test results should be  correlated with clinical presentation, patient history, and epidemiology.          Imagin/5/21 CT A/P revealed pelvic cyst    Plan: 70y Female presents for ct guided pelvic cyst aspiration  -NPO since  midnight  -last aspirin taken 21  -Risks/Benefits/alternatives explained with the patient and/or healthcare proxy and witnessed informed consent obtained once pt confirmed comprehension.    Roberta Swann [facility]- [facility]  ext [phone]  # [phone]

## 2021-04-23 DIAGNOSIS — N94.89 OTHER SPECIFIED CONDITIONS ASSOCIATED WITH FEMALE GENITAL ORGANS AND MENSTRUAL CYCLE: ICD-10-CM

## 2021-04-26 LAB
CULTURE RESULTS: SIGNIFICANT CHANGE UP
NON-GYNECOLOGICAL CYTOLOGY STUDY: SIGNIFICANT CHANGE UP
SPECIMEN SOURCE: SIGNIFICANT CHANGE UP

## 2021-07-12 ENCOUNTER — OUTPATIENT (OUTPATIENT)
Dept: OUTPATIENT SERVICES | Facility: HOSPITAL | Age: 71
LOS: 1 days | End: 2021-07-12
Payer: MEDICARE

## 2021-07-12 ENCOUNTER — RESULT REVIEW (OUTPATIENT)
Age: 71
End: 2021-07-12

## 2021-07-12 ENCOUNTER — APPOINTMENT (OUTPATIENT)
Dept: CT IMAGING | Facility: CLINIC | Age: 71
End: 2021-07-12
Payer: MEDICARE

## 2021-07-12 DIAGNOSIS — M19.012 PRIMARY OSTEOARTHRITIS, LEFT SHOULDER: Chronic | ICD-10-CM

## 2021-07-12 DIAGNOSIS — R19.00 INTRA-ABDOMINAL AND PELVIC SWELLING, MASS AND LUMP, UNSPECIFIED SITE: ICD-10-CM

## 2021-07-12 PROCEDURE — 72193 CT PELVIS W/DYE: CPT | Mod: 26,MH

## 2021-07-12 PROCEDURE — 72193 CT PELVIS W/DYE: CPT

## 2021-07-12 PROCEDURE — 82565 ASSAY OF CREATININE: CPT

## 2021-07-19 ENCOUNTER — NON-APPOINTMENT (OUTPATIENT)
Age: 71
End: 2021-07-19

## 2021-08-09 ENCOUNTER — APPOINTMENT (OUTPATIENT)
Dept: CARDIOLOGY | Facility: CLINIC | Age: 71
End: 2021-08-09
Payer: MEDICARE

## 2021-08-09 ENCOUNTER — NON-APPOINTMENT (OUTPATIENT)
Age: 71
End: 2021-08-09

## 2021-08-09 VITALS
DIASTOLIC BLOOD PRESSURE: 70 MMHG | BODY MASS INDEX: 19.84 KG/M2 | WEIGHT: 112 LBS | SYSTOLIC BLOOD PRESSURE: 90 MMHG | HEART RATE: 55 BPM | OXYGEN SATURATION: 100 %

## 2021-08-09 VITALS — SYSTOLIC BLOOD PRESSURE: 90 MMHG | DIASTOLIC BLOOD PRESSURE: 66 MMHG

## 2021-08-09 PROCEDURE — 93000 ELECTROCARDIOGRAM COMPLETE: CPT

## 2021-08-09 PROCEDURE — 99214 OFFICE O/P EST MOD 30 MIN: CPT

## 2021-08-09 NOTE — DISCUSSION/SUMMARY
[FreeTextEntry1] : Ms Adams  Continues to note some lightheadedness, but has not fainted since her visit a year ago.  Her exam shows low blood pressure of about 100/60, clear lungs, and a normal cardiac exam.  Her EKG shows left axis deviation and I reassured her this was not a new finding.\par \par She is on no medication and none was started.  I stressed the importance to her of making sure she is well-hydrated given her low blood pressure and history of syncope in the past.  She will follow-up here in a year

## 2021-08-09 NOTE — HISTORY OF PRESENT ILLNESS
[FreeTextEntry1] : 71-year-old female with a history of vasodepressive syncope and palpitations.  She has continued to note dyspnea climbing stairs, but is otherwise active and is not having palpitations.  She had an EKG done at her internist office which showed left axis deviation.  She is concerned, but this is been present in the past.

## 2021-08-16 ENCOUNTER — APPOINTMENT (OUTPATIENT)
Dept: UROLOGY | Facility: CLINIC | Age: 71
End: 2021-08-16
Payer: MEDICARE

## 2021-08-16 VITALS
DIASTOLIC BLOOD PRESSURE: 69 MMHG | BODY MASS INDEX: 19.84 KG/M2 | HEIGHT: 63 IN | SYSTOLIC BLOOD PRESSURE: 118 MMHG | WEIGHT: 112 LBS | TEMPERATURE: 97.6 F | HEART RATE: 57 BPM | RESPIRATION RATE: 15 BRPM | OXYGEN SATURATION: 98 %

## 2021-08-16 PROCEDURE — 99213 OFFICE O/P EST LOW 20 MIN: CPT

## 2021-08-16 NOTE — HISTORY OF PRESENT ILLNESS
[FreeTextEntry1] : She is a 71-year-old woman who is seen today in follow-up for urethral mucosal prolapse.  She is asymptomatic at this time.  Urinalysis by her primary care physician in August 2021 showed leukocytes and urine culture showed strep viridans.  She was told to repeat urine tests.  She is on Vagifem.  Every now and then she has slight burning after urination.  There is no hematuria or dysuria.\par \par Previous note: She underwent excision of urethral prolapse in 11/2012. Pathology showed granulation tissue. Nocturia is 1-3 times depending on how much she drinks.

## 2021-08-16 NOTE — PHYSICAL EXAM
[General Appearance - Well Developed] : well developed [Normal Appearance] : normal appearance [General Appearance - Well Nourished] : well nourished [Well Groomed] : well groomed [General Appearance - In No Acute Distress] : no acute distress [Abdomen Soft] : soft [Abdomen Tenderness] : non-tender [Costovertebral Angle Tenderness] : no ~M costovertebral angle tenderness [Urinary Bladder Findings] : the bladder was normal on palpation [FreeTextEntry1] : RN in room, small urethral prolapse at 6 o'clock position [] : no respiratory distress [Respiration, Rhythm And Depth] : normal respiratory rhythm and effort [Exaggerated Use Of Accessory Muscles For Inspiration] : no accessory muscle use

## 2021-08-17 LAB
APPEARANCE: CLEAR
BACTERIA: NEGATIVE
BILIRUBIN URINE: NEGATIVE
BLOOD URINE: NEGATIVE
COLOR: YELLOW
GLUCOSE QUALITATIVE U: NEGATIVE
HYALINE CASTS: 2 /LPF
KETONES URINE: NEGATIVE
LEUKOCYTE ESTERASE URINE: ABNORMAL
MICROSCOPIC-UA: NORMAL
NITRITE URINE: NEGATIVE
PH URINE: 7
PROTEIN URINE: NEGATIVE
RED BLOOD CELLS URINE: 3 /HPF
SPECIFIC GRAVITY URINE: 1.01
SQUAMOUS EPITHELIAL CELLS: 2 /HPF
UROBILINOGEN URINE: NORMAL
WHITE BLOOD CELLS URINE: 8 /HPF

## 2021-08-18 ENCOUNTER — NON-APPOINTMENT (OUTPATIENT)
Age: 71
End: 2021-08-18

## 2021-08-18 LAB — BACTERIA UR CULT: NORMAL

## 2021-11-18 ENCOUNTER — APPOINTMENT (OUTPATIENT)
Dept: OPHTHALMOLOGY | Facility: CLINIC | Age: 71
End: 2021-11-18
Payer: MEDICARE

## 2021-11-18 ENCOUNTER — NON-APPOINTMENT (OUTPATIENT)
Age: 71
End: 2021-11-18

## 2021-11-18 PROCEDURE — 99213 OFFICE O/P EST LOW 20 MIN: CPT

## 2022-01-10 ENCOUNTER — OUTPATIENT (OUTPATIENT)
Dept: OUTPATIENT SERVICES | Facility: HOSPITAL | Age: 72
LOS: 1 days | End: 2022-01-10
Payer: MEDICARE

## 2022-01-10 ENCOUNTER — APPOINTMENT (OUTPATIENT)
Dept: CT IMAGING | Facility: CLINIC | Age: 72
End: 2022-01-10
Payer: MEDICARE

## 2022-01-10 DIAGNOSIS — R19.00 INTRA-ABDOMINAL AND PELVIC SWELLING, MASS AND LUMP, UNSPECIFIED SITE: ICD-10-CM

## 2022-01-10 DIAGNOSIS — M19.012 PRIMARY OSTEOARTHRITIS, LEFT SHOULDER: Chronic | ICD-10-CM

## 2022-01-10 PROCEDURE — G1004: CPT

## 2022-01-10 PROCEDURE — 72193 CT PELVIS W/DYE: CPT | Mod: 26,MF

## 2022-01-10 PROCEDURE — 82565 ASSAY OF CREATININE: CPT

## 2022-01-10 PROCEDURE — 72193 CT PELVIS W/DYE: CPT | Mod: MF

## 2022-01-23 PROBLEM — H02.9 EYELID LESION: Status: RESOLVED | Noted: 2018-06-12 | Resolved: 2022-01-23

## 2022-01-23 PROBLEM — H01.003 BLEPHARITIS OF BOTH EYES, UNSPECIFIED EYELID, UNSPECIFIED TYPE: Status: RESOLVED | Noted: 2017-06-29 | Resolved: 2022-01-23

## 2022-01-23 PROBLEM — H92.01 RIGHT EAR PAIN: Status: RESOLVED | Noted: 2020-10-05 | Resolved: 2022-01-23

## 2022-01-23 PROBLEM — R93.5 ABNORMAL CT SCAN, PELVIS: Status: ACTIVE | Noted: 2022-01-23

## 2022-01-23 RX ORDER — TOBRAMYCIN AND DEXAMETHASONE 3; 1 MG/ML; MG/ML
0.3-0.1 SUSPENSION/ DROPS OPHTHALMIC
Qty: 5 | Refills: 0 | Status: DISCONTINUED | COMMUNITY
Start: 2018-06-21 | End: 2022-01-23

## 2022-01-25 ENCOUNTER — APPOINTMENT (OUTPATIENT)
Dept: INTERVENTIONAL RADIOLOGY/VASCULAR | Facility: CLINIC | Age: 72
End: 2022-01-25
Payer: MEDICARE

## 2022-01-25 DIAGNOSIS — R93.5 ABNORMAL FINDINGS ON DIAGNOSTIC IMAGING OF OTHER ABDOMINAL REGIONS, INCLUDING RETROPERITONEUM: ICD-10-CM

## 2022-01-25 DIAGNOSIS — H92.01 OTALGIA, RIGHT EAR: ICD-10-CM

## 2022-01-25 DIAGNOSIS — Z87.898 PERSONAL HISTORY OF OTHER SPECIFIED CONDITIONS: ICD-10-CM

## 2022-01-25 DIAGNOSIS — Z01.818 ENCOUNTER FOR OTHER PREPROCEDURAL EXAMINATION: ICD-10-CM

## 2022-01-25 DIAGNOSIS — H01.003 UNSPECIFIED BLEPHARITIS RIGHT EYE, UNSPECIFIED EYELID: ICD-10-CM

## 2022-01-25 DIAGNOSIS — K21.9 GASTRO-ESOPHAGEAL REFLUX DISEASE W/OUT ESOPHAGITIS: ICD-10-CM

## 2022-01-25 DIAGNOSIS — H02.9 UNSPECIFIED DISORDER OF EYELID: ICD-10-CM

## 2022-01-25 DIAGNOSIS — M19.90 UNSPECIFIED OSTEOARTHRITIS, UNSPECIFIED SITE: ICD-10-CM

## 2022-01-25 DIAGNOSIS — Z63.5 DISRUPTION OF FAMILY BY SEPARATION AND DIVORCE: ICD-10-CM

## 2022-01-25 DIAGNOSIS — H01.006 UNSPECIFIED BLEPHARITIS RIGHT EYE, UNSPECIFIED EYELID: ICD-10-CM

## 2022-01-25 DIAGNOSIS — Z86.018 PERSONAL HISTORY OF OTHER BENIGN NEOPLASM: ICD-10-CM

## 2022-01-25 PROCEDURE — 99204 OFFICE O/P NEW MOD 45 MIN: CPT | Mod: 95

## 2022-01-25 RX ORDER — TACROLIMUS 1 MG/G
0.1 OINTMENT TOPICAL
Qty: 60 | Refills: 0 | Status: DISCONTINUED | COMMUNITY
Start: 2017-01-18 | End: 2022-01-25

## 2022-01-25 RX ORDER — CALCIPOTRIENE AND BETAMETHASONE DIPROPIONATE 50; .5 UG/G; MG/G
0.005-0.064 OINTMENT TOPICAL
Refills: 0 | Status: DISCONTINUED | COMMUNITY
End: 2022-01-25

## 2022-01-25 RX ORDER — OMEPRAZOLE 40 MG/1
40 CAPSULE, DELAYED RELEASE ORAL
Refills: 0 | Status: DISCONTINUED | COMMUNITY
End: 2022-01-25

## 2022-01-25 RX ORDER — ESTRADIOL 10 UG/1
10 INSERT VAGINAL
Qty: 36 | Refills: 0 | Status: DISCONTINUED | COMMUNITY
Start: 2017-03-24 | End: 2022-01-25

## 2022-01-25 SDOH — SOCIAL STABILITY - SOCIAL INSECURITY: DISRUPTION OF FAMILY BY SEPARATION AND DIVORCE: Z63.5

## 2022-02-02 ENCOUNTER — OUTPATIENT (OUTPATIENT)
Dept: OUTPATIENT SERVICES | Facility: HOSPITAL | Age: 72
LOS: 1 days | End: 2022-02-02
Payer: MEDICARE

## 2022-02-02 DIAGNOSIS — M19.012 PRIMARY OSTEOARTHRITIS, LEFT SHOULDER: Chronic | ICD-10-CM

## 2022-02-02 DIAGNOSIS — Z11.52 ENCOUNTER FOR SCREENING FOR COVID-19: ICD-10-CM

## 2022-02-02 LAB — SARS-COV-2 RNA SPEC QL NAA+PROBE: SIGNIFICANT CHANGE UP

## 2022-02-02 PROCEDURE — U0003: CPT

## 2022-02-02 PROCEDURE — U0005: CPT

## 2022-02-02 PROCEDURE — C9803: CPT

## 2022-02-04 ENCOUNTER — APPOINTMENT (OUTPATIENT)
Dept: CT IMAGING | Facility: HOSPITAL | Age: 72
End: 2022-02-04

## 2022-02-04 ENCOUNTER — RESULT REVIEW (OUTPATIENT)
Age: 72
End: 2022-02-04

## 2022-02-04 ENCOUNTER — OUTPATIENT (OUTPATIENT)
Dept: OUTPATIENT SERVICES | Facility: HOSPITAL | Age: 72
LOS: 1 days | End: 2022-02-04
Payer: MEDICARE

## 2022-02-04 VITALS
OXYGEN SATURATION: 99 % | HEIGHT: 63 IN | HEART RATE: 57 BPM | SYSTOLIC BLOOD PRESSURE: 109 MMHG | DIASTOLIC BLOOD PRESSURE: 49 MMHG | TEMPERATURE: 98 F | WEIGHT: 115.08 LBS | RESPIRATION RATE: 18 BRPM

## 2022-02-04 VITALS
TEMPERATURE: 98 F | HEART RATE: 60 BPM | SYSTOLIC BLOOD PRESSURE: 110 MMHG | OXYGEN SATURATION: 100 % | RESPIRATION RATE: 18 BRPM | DIASTOLIC BLOOD PRESSURE: 68 MMHG

## 2022-02-04 DIAGNOSIS — M19.012 PRIMARY OSTEOARTHRITIS, LEFT SHOULDER: Chronic | ICD-10-CM

## 2022-02-04 DIAGNOSIS — R18.8 OTHER ASCITES: ICD-10-CM

## 2022-02-04 LAB
ANION GAP SERPL CALC-SCNC: 10 MMOL/L
BASOPHILS # BLD AUTO: 0.06 K/UL
BASOPHILS NFR BLD AUTO: 1.3 %
BUN SERPL-MCNC: 20 MG/DL
CALCIUM SERPL-MCNC: 9.6 MG/DL
CHLORIDE SERPL-SCNC: 104 MMOL/L
CO2 SERPL-SCNC: 28 MMOL/L
CREAT SERPL-MCNC: 0.73 MG/DL
EOSINOPHIL # BLD AUTO: 0.14 K/UL
EOSINOPHIL NFR BLD AUTO: 3 %
GLUCOSE SERPL-MCNC: 89 MG/DL
GRAM STN FLD: SIGNIFICANT CHANGE UP
HCT VFR BLD CALC: 40.6 %
HGB BLD-MCNC: 12.9 G/DL
IMM GRANULOCYTES NFR BLD AUTO: 0.2 %
INR PPP: 1.05 RATIO
LYMPHOCYTES # BLD AUTO: 1.46 K/UL
LYMPHOCYTES NFR BLD AUTO: 30.8 %
MAN DIFF?: NORMAL
MCHC RBC-ENTMCNC: 30.4 PG
MCHC RBC-ENTMCNC: 31.8 GM/DL
MCV RBC AUTO: 95.5 FL
MONOCYTES # BLD AUTO: 0.45 K/UL
MONOCYTES NFR BLD AUTO: 9.5 %
NEUTROPHILS # BLD AUTO: 2.62 K/UL
NEUTROPHILS NFR BLD AUTO: 55.2 %
PLATELET # BLD AUTO: 285 K/UL
POTASSIUM SERPL-SCNC: 4.7 MMOL/L
PT BLD: 12.4 SEC
RBC # BLD: 4.25 M/UL
RBC # FLD: 13.3 %
SODIUM SERPL-SCNC: 141 MMOL/L
SPECIMEN SOURCE: SIGNIFICANT CHANGE UP
WBC # FLD AUTO: 4.74 K/UL

## 2022-02-04 PROCEDURE — 10160 PNXR ASPIR ABSC HMTMA BULLA: CPT

## 2022-02-04 PROCEDURE — 77012 CT SCAN FOR NEEDLE BIOPSY: CPT | Mod: 26

## 2022-02-04 PROCEDURE — C1729: CPT

## 2022-02-04 PROCEDURE — 87205 SMEAR GRAM STAIN: CPT

## 2022-02-04 PROCEDURE — 87075 CULTR BACTERIA EXCEPT BLOOD: CPT

## 2022-02-04 PROCEDURE — 77012 CT SCAN FOR NEEDLE BIOPSY: CPT

## 2022-02-04 PROCEDURE — 87070 CULTURE OTHR SPECIMN AEROBIC: CPT

## 2022-02-04 RX ORDER — ESTRADIOL 4 UG/1
0 INSERT VAGINAL
Qty: 0 | Refills: 0 | DISCHARGE

## 2022-02-04 RX ORDER — ASPIRIN/CALCIUM CARB/MAGNESIUM 324 MG
1 TABLET ORAL
Qty: 0 | Refills: 0 | DISCHARGE

## 2022-02-04 RX ORDER — FAMOTIDINE 10 MG/ML
0 INJECTION INTRAVENOUS
Qty: 0 | Refills: 0 | DISCHARGE

## 2022-02-04 RX ORDER — OMEPRAZOLE 10 MG/1
1 CAPSULE, DELAYED RELEASE ORAL
Qty: 0 | Refills: 0 | DISCHARGE

## 2022-02-04 NOTE — PROCEDURE NOTE - PROCEDURE FINDINGS AND DETAILS
CT guided FNA of right pelvic cyst using 19 g sheathed needle yielded approx 80 cc of clear fluid.  Specimens sent for analysis.  Post- images - decompression of cyst with no immediate complication.  Full report to follow.

## 2022-02-04 NOTE — ASU DISCHARGE PLAN (ADULT/PEDIATRIC) - NS MD DC FALL RISK RISK
For information on Fall & Injury Prevention, visit: [email or web address] OR  [email or web address] OR  https://www.cdc.gov/steadi/patient.html

## 2022-02-04 NOTE — ASU DISCHARGE PLAN (ADULT/PEDIATRIC) - ASU DC SPECIAL INSTRUCTIONSFT
Aspiration Discharge    Discharge Instructions  - You have had a pelvic cyst aspiration.   - You may shower in 24 hours. No soaking or swimming until the site is completely healed.  - Keep the area covered and dry for the next 24 hours.  - Do not perform any heavy lifting for the next few days or until the site is healed.  - You may resume your normal diet.  - You may resume your normal medications however you should wait 24 hours before restarting aspirin, plavix, or blood thinners.  - It is normal to experience some pain over the site for the next few days. You may take apply ice to the area (20 minutes on, 20 minutes off) and take Tylenol for that pain. Do not take more frequently than every 6 hours and do not exceed more than 3000mg of Tylenol in a 24 hour period.    - You were given conscious sedation which may make you drowsy, therefore you need someone to stay with you until the morning following the procedure.  - Do not drive, engage in heavy lifting or strenuous activity, or drink any alcoholic beverages for the next 24 hours.   - You may resume normal activity in 24 hours.    Notify your primary physician and/or Interventional Radiology IMMEDIATELY if you experience any of the following       - Fever of 101F or 38C       - Chills or Rigors/ Shakes       - Swelling and/or Redness in the area around the biopsy site       - Worsening Pain       - Blood soaked bandages or worsening bleeding       - Lightheadedness and/or dizziness upon standing       - Chest Pain/ Tightness       - Shortness of Breath       - Difficulty walking    If you have a problem that you believe requires IMMEDIATE attention, please go to your NEAREST Emergency Room. If you believe your problem can safely wait until you speak to a physician, please call Interventional Radiology for any concerns.    During Normal Weekday Business Hours- You can contact the Interventional Radiology department during normal business hours via telephone.  During Evenings and Weekends- If you need to contact Interventional Radiology during off hours, do so by calling the hospital and requesting to be connected to the Interventional Radiologist on call.

## 2022-02-04 NOTE — ASU PATIENT PROFILE, ADULT - NSICDXPASTMEDICALHX_GEN_ALL_CORE_FT
PAST MEDICAL HISTORY:  Anticardiolipin antibody positive 12yrs ago    Arthritis x many yrs    Basal cell carcinoma of face 11/12    Lupus anticoagulant positive 12yrs ago    Migraine 15yrs    Uterine leiomyoma 10yrs ago

## 2022-02-04 NOTE — ASU DISCHARGE PLAN (ADULT/PEDIATRIC) - NURSING INSTRUCTIONS
Please feel free to contact us at [phone] if any problem arises. After 6PM, Monday and Friday, on weekends and on holidays, please call [phone] and ask for the radiology resident on call to be paged.

## 2022-02-04 NOTE — PRE PROCEDURE NOTE - PRE PROCEDURE EVALUATION
HPI: 71y Female with recurrent pelvic cyst. Presents for aspiration.    Allergies: grass, molds, dust (Sneezing; Eye Irritation)    Medications (Abx/Cardiac/Anticoagulation/Blood Products)      Data:    T(C): --  HR: --  BP: --  RR: --  SpO2: --    Exam  General: NAD  Chest: Nonlabored breathing  Extremities: WWP    Imaging: Reviewed    Plan:   -71y Female presents for pelvic cyst aspiration.  -Risks/Benefits/alternatives explained with the patient and/or healthcare proxy and witnessed informed consent obtained.

## 2022-02-04 NOTE — PRE PROCEDURE NOTE - ATTENDING COMMENTS
As above, discussed with Dr. Mcconnell and pt will only perform FNA at this point in time.  There have been no changes in the patient's status since the clinic visit.

## 2022-02-04 NOTE — ASU PATIENT PROFILE, ADULT - FALL HARM RISK - UNIVERSAL INTERVENTIONS
Bed in lowest position, wheels locked, appropriate side rails in place/Call bell, personal items and telephone in reach/Instruct patient to call for assistance before getting out of bed or chair/Non-slip footwear when patient is out of bed/[place] to call system/Physically safe environment - no spills, clutter or unnecessary equipment/Purposeful Proactive Rounding/Room/bathroom lighting operational, light cord in reach

## 2022-02-05 RX ORDER — ASPIRIN/CALCIUM CARB/MAGNESIUM 324 MG
1 TABLET ORAL
Qty: 0 | Refills: 0 | DISCHARGE
Start: 2022-02-05

## 2022-02-07 LAB — NON-GYNECOLOGICAL CYTOLOGY STUDY: SIGNIFICANT CHANGE UP

## 2022-02-09 LAB
CULTURE RESULTS: NO GROWTH — SIGNIFICANT CHANGE UP
SPECIMEN SOURCE: SIGNIFICANT CHANGE UP

## 2022-02-10 DIAGNOSIS — R19.09 OTHER INTRA-ABDOMINAL AND PELVIC SWELLING, MASS AND LUMP: ICD-10-CM

## 2022-02-11 ENCOUNTER — RESULT REVIEW (OUTPATIENT)
Age: 72
End: 2022-02-11

## 2022-02-11 ENCOUNTER — NON-APPOINTMENT (OUTPATIENT)
Age: 72
End: 2022-02-11

## 2022-04-28 ENCOUNTER — NON-APPOINTMENT (OUTPATIENT)
Age: 72
End: 2022-04-28

## 2022-04-28 ENCOUNTER — APPOINTMENT (OUTPATIENT)
Dept: CARDIOLOGY | Facility: CLINIC | Age: 72
End: 2022-04-28
Payer: MEDICARE

## 2022-04-28 VITALS
WEIGHT: 114 LBS | HEART RATE: 56 BPM | SYSTOLIC BLOOD PRESSURE: 110 MMHG | BODY MASS INDEX: 20.2 KG/M2 | OXYGEN SATURATION: 99 % | DIASTOLIC BLOOD PRESSURE: 78 MMHG | HEIGHT: 63 IN

## 2022-04-28 DIAGNOSIS — I95.1 ORTHOSTATIC HYPOTENSION: ICD-10-CM

## 2022-04-28 DIAGNOSIS — R94.31 ABNORMAL ELECTROCARDIOGRAM [ECG] [EKG]: ICD-10-CM

## 2022-04-28 DIAGNOSIS — R55 SYNCOPE AND COLLAPSE: ICD-10-CM

## 2022-04-28 DIAGNOSIS — R06.00 DYSPNEA, UNSPECIFIED: ICD-10-CM

## 2022-04-28 PROCEDURE — 99214 OFFICE O/P EST MOD 30 MIN: CPT

## 2022-04-28 PROCEDURE — 36415 COLL VENOUS BLD VENIPUNCTURE: CPT

## 2022-04-28 PROCEDURE — 93000 ELECTROCARDIOGRAM COMPLETE: CPT

## 2022-04-28 NOTE — HISTORY OF PRESENT ILLNESS
[FreeTextEntry1] : 71-year-old female with a history of hypertension, dyspnea on exertion, and palpitations without significant structural heart disease.  She is being seen on an urgent basis because of near syncope which is induced with physical activity.  She has had this in the past but is much more frequent and dramatic at present.  She states she feels "like I will pass out" when she climbs stairs or does any other sort of exertion.  She line dances regularly, but finds that she has to stop repeatedly during her sessions during the past month.  She is not short of breath and feels otherwise okay.

## 2022-04-28 NOTE — DISCUSSION/SUMMARY
[FreeTextEntry1] : Ms Atkins has noted an exacerbation of lightheadedness with physical activity during the past 2 months.  Her exam shows regular rhythm at a rate of about 80, normal blood pressure which does not change when she stands, clear lungs, and a normal cardiac exam.  Her EKG shows left axis deviation and is unchanged.\par \par The cause of the symptoms is unclear.  She is very concerned.  Blood work was drawn, a Zio patch was placed for a period of 3 days, and she was scheduled for stress echo.

## 2022-04-28 NOTE — REVIEW OF SYSTEMS
[Feeling Fatigued] : feeling fatigued [Dizziness] : dizziness [Negative] : Neurological [Sore Throat] : no sore throat

## 2022-05-02 LAB
ALBUMIN SERPL ELPH-MCNC: 4.3 G/DL
ALP BLD-CCNC: 49 U/L
ALT SERPL-CCNC: 14 U/L
ANION GAP SERPL CALC-SCNC: 10 MMOL/L
AST SERPL-CCNC: 23 U/L
BASOPHILS # BLD AUTO: 0.06 K/UL
BASOPHILS NFR BLD AUTO: 1.5 %
BILIRUB SERPL-MCNC: 0.2 MG/DL
BUN SERPL-MCNC: 23 MG/DL
CALCIUM SERPL-MCNC: 9.9 MG/DL
CHLORIDE SERPL-SCNC: 104 MMOL/L
CHOLEST SERPL-MCNC: 219 MG/DL
CO2 SERPL-SCNC: 26 MMOL/L
CREAT SERPL-MCNC: 0.75 MG/DL
EGFR: 85 ML/MIN/1.73M2
EOSINOPHIL # BLD AUTO: 0.15 K/UL
EOSINOPHIL NFR BLD AUTO: 3.8 %
ESTIMATED AVERAGE GLUCOSE: 120 MG/DL
GLUCOSE SERPL-MCNC: 87 MG/DL
HBA1C MFR BLD HPLC: 5.8 %
HCT VFR BLD CALC: 37.8 %
HDLC SERPL-MCNC: 68 MG/DL
HGB BLD-MCNC: 12.5 G/DL
IMM GRANULOCYTES NFR BLD AUTO: 0.5 %
LDLC SERPL CALC-MCNC: 140 MG/DL
LYMPHOCYTES # BLD AUTO: 1.4 K/UL
LYMPHOCYTES NFR BLD AUTO: 35.8 %
MAN DIFF?: NORMAL
MCHC RBC-ENTMCNC: 30.4 PG
MCHC RBC-ENTMCNC: 33.1 GM/DL
MCV RBC AUTO: 92 FL
MONOCYTES # BLD AUTO: 0.47 K/UL
MONOCYTES NFR BLD AUTO: 12 %
NEUTROPHILS # BLD AUTO: 1.81 K/UL
NEUTROPHILS NFR BLD AUTO: 46.4 %
NONHDLC SERPL-MCNC: 152 MG/DL
PLATELET # BLD AUTO: 238 K/UL
POTASSIUM SERPL-SCNC: 4.6 MMOL/L
PROT SERPL-MCNC: 6.6 G/DL
RBC # BLD: 4.11 M/UL
RBC # FLD: 13.2 %
SODIUM SERPL-SCNC: 141 MMOL/L
TRIGL SERPL-MCNC: 59 MG/DL
TSH SERPL-ACNC: 3.28 UIU/ML
WBC # FLD AUTO: 3.91 K/UL

## 2022-05-16 ENCOUNTER — APPOINTMENT (OUTPATIENT)
Dept: CT IMAGING | Facility: CLINIC | Age: 72
End: 2022-05-16

## 2022-05-27 ENCOUNTER — APPOINTMENT (OUTPATIENT)
Dept: CARDIOLOGY | Facility: CLINIC | Age: 72
End: 2022-05-27
Payer: MEDICARE

## 2022-05-27 PROCEDURE — 93306 TTE W/DOPPLER COMPLETE: CPT

## 2022-05-27 PROCEDURE — 93351 STRESS TTE COMPLETE: CPT

## 2022-05-27 PROCEDURE — 93325 DOPPLER ECHO COLOR FLOW MAPG: CPT | Mod: 59

## 2022-05-27 PROCEDURE — 93321 DOPPLER ECHO F-UP/LMTD STD: CPT | Mod: 59

## 2022-06-28 ENCOUNTER — OUTPATIENT (OUTPATIENT)
Dept: OUTPATIENT SERVICES | Facility: HOSPITAL | Age: 72
LOS: 1 days | End: 2022-06-28
Payer: MEDICARE

## 2022-06-28 ENCOUNTER — APPOINTMENT (OUTPATIENT)
Dept: CT IMAGING | Facility: CLINIC | Age: 72
End: 2022-06-28

## 2022-06-28 DIAGNOSIS — R18.8 OTHER ASCITES: ICD-10-CM

## 2022-06-28 DIAGNOSIS — M19.012 PRIMARY OSTEOARTHRITIS, LEFT SHOULDER: Chronic | ICD-10-CM

## 2022-06-28 PROCEDURE — G1004: CPT

## 2022-06-28 PROCEDURE — 72193 CT PELVIS W/DYE: CPT | Mod: 26,MF

## 2022-06-28 PROCEDURE — 72193 CT PELVIS W/DYE: CPT | Mod: MF

## 2022-07-25 ENCOUNTER — APPOINTMENT (OUTPATIENT)
Dept: SURGICAL ONCOLOGY | Facility: CLINIC | Age: 72
End: 2022-07-25

## 2022-07-25 ENCOUNTER — RESULT REVIEW (OUTPATIENT)
Age: 72
End: 2022-07-25

## 2022-07-25 VITALS
TEMPERATURE: 97.7 F | RESPIRATION RATE: 15 BRPM | HEIGHT: 63 IN | WEIGHT: 114 LBS | OXYGEN SATURATION: 98 % | DIASTOLIC BLOOD PRESSURE: 74 MMHG | HEART RATE: 58 BPM | BODY MASS INDEX: 20.2 KG/M2 | SYSTOLIC BLOOD PRESSURE: 121 MMHG

## 2022-07-25 DIAGNOSIS — R18.8 OTHER ASCITES: ICD-10-CM

## 2022-07-25 PROCEDURE — 99214 OFFICE O/P EST MOD 30 MIN: CPT

## 2022-07-25 NOTE — CONSULT LETTER
[Dear  ___] : Dear  [unfilled], [Courtesy Letter:] : I had the pleasure of seeing your patient, [unfilled], in my office today. [Please see my note below.] : Please see my note below. [Consult Closing:] : Thank you very much for allowing me to participate in the care of this patient.  If you have any questions, please do not hesitate to contact me. [Sincerely,] : Sincerely, [FreeTextEntry1] : I will keep you informed of my follow-up. [FreeTextEntry3] : Leobardo Mcconnell MD FACS\par Chief of Surgical Oncology\par \par

## 2022-07-25 NOTE — HISTORY OF PRESENT ILLNESS
[de-identified] : Ms. ROSLYN UNGER is a 71 year old woman here today for a follow-up visit for a pelvic cyst. \par \par \par The patient has a history of arthritis, herniated discs, spinal stenosis and has been seeing Dr. Blair for many years.  She was recently experiencing lower back pain, worse with prolonged sitting, and sought care with Dr. Blair. Pt. states she underwent an MRI of the lumbar spine ~2-3 weeks ago which showed a partially visualized adnexal mass.  She was then referred for a Pelvic MRI which she completed on 3/1/2021 revealing a 6 x 5.5 cm nonspecific large cystic lesion in the posterior right pelvis ventral to the sacrum resulting in mass effect upon the adjacent bowel with a few thin internal septations but no locally aggressive MRI characteristics. No adenopathy. \par \par Past medical history notable for arthritis, anticardiolipin syndrome, right lattice degeneration, lupus anticoagulation disorder, orthostatic hypotension, palpitations, urethral prolapse (on Vagifem).  Past surgical history notable for , tonsillectomy, hysterectomy and shoulder surgery.   Pt. remains on ASA 81 mg daily. \par \par She had IR drainage of the cyst 2021 with negative cytology. \par \par CT pelvis from 2022 showed increase in size of persistent pelvic cyst.\par Pelvic cyst was against drained 2022 with negative cytology. (Post procedure note stated near complete decompression of cyst) Roslyn reported pain was much better after drainage, if pain or fluid collection reoccur, may need surgery. \par \par CT pelvis 2022 : Re demonstration of a cyst identified deep in the right pelvis today measuring 5.5 x 4.3 x 5.2 cm in size, previously measuring 5.4 x 6.4 x 5.5 cm\par Denies any accompanying symptoms, denies issues with urination or bowel, denies back pain.

## 2022-07-25 NOTE — ASSESSMENT
[FreeTextEntry1] : IMP:\par 70yo F with persistent stabl;e asymptomatic pelvic cyst, \par \par CT pelvis 1/2022 showed increase of cyst\par s/p IR drainage 4/2021, negative cytology\par \par reoccurrence and drainage 2/2022, negative cytology, if pain or fluid collection reoccur, may need surgery. \par \par CT pelvis 6/2022 : Redemonstration of a cyst identified deep in the right pelvis today measuring 5.5 x 4.3 x 5.2 cm in size, previously measuring 5.4 x 6.4 x 5.5 cm\par \par \par PLAN:\par Watchful waiting\par RTO yearlyw with CT of pelvis\par RTO sooner if symptoms occur\par

## 2022-07-25 NOTE — PHYSICAL EXAM
[de-identified] : pelvic exam: Cervix still present with right pelvic fullness but no distinct mass [Normal] : supple, no neck mass and thyroid not enlarged [Normal Supraclavicular Lymph Nodes] : normal supraclavicular lymph nodes [Normal Groin Lymph Nodes] : normal groin lymph nodes [Normal] : oriented to person, place and time, with appropriate affect [de-identified] : no masses or tenderness

## 2022-08-15 ENCOUNTER — APPOINTMENT (OUTPATIENT)
Dept: CARDIOLOGY | Facility: CLINIC | Age: 72
End: 2022-08-15

## 2022-08-22 ENCOUNTER — APPOINTMENT (OUTPATIENT)
Dept: UROLOGY | Facility: CLINIC | Age: 72
End: 2022-08-22

## 2022-08-22 VITALS
HEART RATE: 55 BPM | DIASTOLIC BLOOD PRESSURE: 55 MMHG | SYSTOLIC BLOOD PRESSURE: 99 MMHG | RESPIRATION RATE: 14 BRPM | BODY MASS INDEX: 20.2 KG/M2 | WEIGHT: 114 LBS | OXYGEN SATURATION: 95 % | HEIGHT: 63 IN

## 2022-08-22 PROCEDURE — 99213 OFFICE O/P EST LOW 20 MIN: CPT

## 2022-08-22 NOTE — ASSESSMENT
[FreeTextEntry1] : Examination of the urethral mucosal prolapse at the 6 o'clock position appears to be unchanged.  There is no evidence of bleeding, discharge etc.  It has been excised once many years ago.  She will continue to monitor on annual basis.

## 2022-08-22 NOTE — PHYSICAL EXAM
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

## 2022-08-22 NOTE — HISTORY OF PRESENT ILLNESS
[FreeTextEntry1] : She is a 72 year-old woman who is seen today in follow-up for urethral mucosal prolapse.  She is voiding well.  There is no spotting or discharge.  There is no gross hematuria.  Urine culture in August 2021 was normal.  She is using Vagifem.\par \par Previous note: She underwent excision of urethral prolapse in 2012. Pathology showed granulation tissue. Nocturia is 1-3 times.

## 2022-09-16 ENCOUNTER — APPOINTMENT (OUTPATIENT)
Dept: OTOLARYNGOLOGY | Facility: CLINIC | Age: 72
End: 2022-09-16

## 2022-09-16 VITALS
DIASTOLIC BLOOD PRESSURE: 61 MMHG | SYSTOLIC BLOOD PRESSURE: 106 MMHG | BODY MASS INDEX: 20.38 KG/M2 | WEIGHT: 115 LBS | HEIGHT: 63 IN | HEART RATE: 56 BPM

## 2022-09-16 DIAGNOSIS — M26.609 UNSPECIFIED TEMPOROMANDIBULAR JOINT DISORDER: ICD-10-CM

## 2022-09-16 PROCEDURE — 99213 OFFICE O/P EST LOW 20 MIN: CPT

## 2022-09-16 NOTE — HISTORY OF PRESENT ILLNESS
[de-identified] : Roslyn Atkins is a 71 yo female who presents for evaluation of right ear and jaw pain. She was previously seen for this and found to have a dental issue requiring a root canal. This occurred two more times and on the third time, she underwent dental extraction followed by implant. About a month ago, she tugged at her right ear. She was seen by her dentist and oral surgeon, who was concerned for possible TMJ. She notes that she has intermittent right earache, along with right jaw tenderness. She denies otorrhea, tinnitus, vertigo, or hearing change. She denies recent fevers or chills. She denies facial weakness or numbness. She denies history of recurrent ear infections. She wears a retainer at night.

## 2022-09-16 NOTE — ASSESSMENT
[FreeTextEntry1] : Roslyn Atkins presents for evaluation of intermittent right otalgia and jaw pain. She has history of TMJ. Otoscopic exam is normal. The patient was told the origin of their ear pain most likely reflects TMJ. They will start a soft diet, use NSAIDs as needed, warm compresses, possible tooth guard. She has been seen by oral surgery. She has undergone TMJ PT before and would like to do that again. 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 Group home counseled regarding conditions which should prompt return.

## 2022-09-20 ENCOUNTER — APPOINTMENT (OUTPATIENT)
Dept: ORTHOPEDIC SURGERY | Facility: CLINIC | Age: 72
End: 2022-09-20

## 2022-09-20 VITALS — HEIGHT: 63 IN | BODY MASS INDEX: 20.38 KG/M2 | WEIGHT: 115 LBS

## 2022-09-20 DIAGNOSIS — M75.51 BURSITIS OF RIGHT SHOULDER: ICD-10-CM

## 2022-09-20 PROCEDURE — 99213 OFFICE O/P EST LOW 20 MIN: CPT

## 2022-09-20 PROCEDURE — 73030 X-RAY EXAM OF SHOULDER: CPT | Mod: RT

## 2022-09-20 NOTE — IMAGING
[de-identified] : Cervical spine\par Inspection normal\par Good active range of motion\par No tenderness\par -negative foraminal closing test bilaterally\par \par Right shoulder\par No swelling\par No palpable tenderness\par Full active range of motion\par Negative impingement\par Supraspinatus strength 5 minus/5\par Radial pulse 2+ [Right] : right shoulder [FreeTextEntry1] : Reviewed and interpreted.  Right shoulder external rotation and outlet views-Type I-II acromion.  Moderate degenerative changes of glenohumeral joint

## 2022-09-20 NOTE — DISCUSSION/SUMMARY
[de-identified] : Continue home exercises\par Continue PT once or twice a week at [facility] in [place]\par Ice p.r.n.\par Tylenol p.r.n.\par I discussed possible repeat Euflexxa injections if her symptoms worsen\par \par Impression:\par Moderate osteoarthritis right shoulder

## 2022-09-20 NOTE — HISTORY OF PRESENT ILLNESS
[Gradual] : gradual [5] : 5 [0] : 0 [Intermittent] : intermittent [Retired] : Work status: retired [de-identified] : The patient has continued pain and stiffness right shoulder.  Pain reaching above her and behind her.  No awakening from sleep at night.  She did have good improvement after previous Euflexxa injections in her shoulder.  She has been going to PT.  Doing home exercises.  No neck pain. [] : Post Surgical Visit: no [de-identified] : 01/23/2021 [de-identified] : Dr. Blair

## 2022-11-22 ENCOUNTER — NON-APPOINTMENT (OUTPATIENT)
Age: 72
End: 2022-11-22

## 2022-11-22 ENCOUNTER — APPOINTMENT (OUTPATIENT)
Dept: OPHTHALMOLOGY | Facility: CLINIC | Age: 72
End: 2022-11-22

## 2022-11-22 PROCEDURE — 92014 COMPRE OPH EXAM EST PT 1/>: CPT

## 2023-03-09 ENCOUNTER — APPOINTMENT (OUTPATIENT)
Dept: ORTHOPEDIC SURGERY | Facility: CLINIC | Age: 73
End: 2023-03-09
Payer: MEDICARE

## 2023-03-09 VITALS — BODY MASS INDEX: 20.38 KG/M2 | WEIGHT: 115 LBS | HEIGHT: 63 IN

## 2023-03-09 PROCEDURE — 73502 X-RAY EXAM HIP UNI 2-3 VIEWS: CPT

## 2023-03-09 PROCEDURE — 99214 OFFICE O/P EST MOD 30 MIN: CPT

## 2023-03-09 PROCEDURE — 72100 X-RAY EXAM L-S SPINE 2/3 VWS: CPT

## 2023-03-09 RX ORDER — ROSUVASTATIN CALCIUM 5 MG/1
TABLET, FILM COATED ORAL
Refills: 0 | Status: ACTIVE | COMMUNITY

## 2023-03-09 NOTE — DATA REVIEWED
[Left] : left [Lower Extremities] : lower extremities [MRI] : MRI [Lumbar Spine] : lumbar spine [I independently reviewed and interpreted images and report] : I independently reviewed and interpreted images and report [FreeTextEntry1] : MRI lumbar spine without contrast\par Date: 26.Feb.2016; Location: office\par Modifiers: -TC Technical component.\par •	29.Feb.2016 This was reviewed. There is multilevel degenerative disc disease. There is severe degenerative disc disease L3-4 through L5-S1. No significant stenosis or herniations are noted. [FreeTextEntry2] : MRI left femur done September 1, 2021 was reviewed.  There is well-circumscribed intramedullary lesion proximal femur reported as 10 mm enchondroma.\par Also reported as moderate tendinopathy and moderate grade tearing of hamstrings at their origin [FreeTextEntry3] : MRI lumbosacral spine done February 10, 2021 was reviewed.  There is multilevel degenerative disc disease.  Moderate stenosis at L2-3.  Mild stenosis at L4-5 with reactive endplate changes.\par Also reported as nonspecific cystic area ventral to visualize lower sacrum and coccyx on the right.  Partially visualized measuring 5 cm in size.  Not appreciated on prior study.  Recommend MRI of the pelvis to further evaluate

## 2023-03-09 NOTE — DISCUSSION/SUMMARY
[de-identified] : The patient will avoid irritating activities\par Moist heat p.r.n.\par Tylenol p.r.n.\par She is referred to [facility] in [place] for therapy program 2-3 times a week\par I discussed possible cortisone injection left hip if ongoing symptoms\par She return for followup in 2 months if persistent problems, otherwise routine followup in 6 months\par \par Impression:\par Lumbosacral strain/spondylosis\par Abductor tendinitis left hip

## 2023-03-09 NOTE — IMAGING
[de-identified] : Normal gait\par \par Lumbosacral spine\par Inspection normal\par Mild tenderness paraspinals left lumbosacral region\par Straight leg raising is negative bilaterally\par Motor lower extremities-normal\par \par Left hip\par No swelling\par Full painless passive range of motion\par Mild tenderness over the abductor insertion\par \par Left leg\par No swelling\par Calf is soft and nontender\par Posterior tibial pulse 2+ [FreeTextEntry1] : Reviewed and interpreted.  Lumbosacral spine AP and lateral views-severe multilevel disc space narrowing.  Mild left lumbar scoliosis [de-identified] : Reviewed and interpreted.  Pelvis AP with lateral left hip-minimal degenerative changes of the hips bilaterally.  Well-circumscribed area of increased bone density proximal femur

## 2023-03-09 NOTE — HISTORY OF PRESENT ILLNESS
Tanner Ramirez - I have prescribed differin for you.  Please apply at bedtime.  Follow up if not improving in 2 weeks.    Patric, Dr Carbajal   [Lower back] : lower back [Left Leg] : left leg [Gradual] : gradual [7] : 7 [0] : 0 [Burning] : burning [Dull/Aching] : dull/aching [Radiating] : radiating [Intermittent] : intermittent [Household chores] : household chores [Leisure] : leisure [Rest] : rest [Physical therapy] : physical therapy [Walking] : walking [Exercising] : exercising [Retired] : Work status: retired [de-identified] : The patient has had pain left lower back and left lateral hip over the past 3 weeks.  No injury.  She has mild pain in her back with change in position.  No radicular complaints.  No numbness or weakness or lower extremities\par She has pain left lateral hip.  Pain when standing and walking.  Pain after sitting and getting up.  Pain with stairs.  No awakening from sleep at night. [] : Post Surgical Visit: no [FreeTextEntry7] : L Knee  [de-identified] : 3/9/2023

## 2023-03-16 ENCOUNTER — APPOINTMENT (OUTPATIENT)
Dept: OTOLARYNGOLOGY | Facility: CLINIC | Age: 73
End: 2023-03-16
Payer: MEDICARE

## 2023-03-16 VITALS
HEART RATE: 60 BPM | SYSTOLIC BLOOD PRESSURE: 96 MMHG | HEIGHT: 63 IN | BODY MASS INDEX: 20.38 KG/M2 | WEIGHT: 115 LBS | DIASTOLIC BLOOD PRESSURE: 62 MMHG

## 2023-03-16 DIAGNOSIS — R42 DIZZINESS AND GIDDINESS: ICD-10-CM

## 2023-03-16 PROCEDURE — 92557 COMPREHENSIVE HEARING TEST: CPT

## 2023-03-16 PROCEDURE — 92567 TYMPANOMETRY: CPT

## 2023-03-16 PROCEDURE — 99214 OFFICE O/P EST MOD 30 MIN: CPT

## 2023-03-16 NOTE — HISTORY OF PRESENT ILLNESS
[de-identified] : RECURRENT DIZZY EPISODE FOR THE PAST FEW DAYS; HAD VOMITTING WITH IT\par FEELING BETTER TODAY\par MEDICAL HX REVIEWED

## 2023-03-16 NOTE — DATA REVIEWED
[de-identified] : \par -TYMPS: TYPE As AU\par -HEARING ESSENTIALLY BORDERLINE WNL/ MILD SNHL 250-8000 HZ AU (MODERATE 6-8KHZ AS)

## 2023-05-04 ENCOUNTER — NON-APPOINTMENT (OUTPATIENT)
Age: 73
End: 2023-05-04

## 2023-05-04 ENCOUNTER — APPOINTMENT (OUTPATIENT)
Dept: CARDIOLOGY | Facility: CLINIC | Age: 73
End: 2023-05-04
Payer: MEDICARE

## 2023-05-04 VITALS
OXYGEN SATURATION: 98 % | HEART RATE: 59 BPM | BODY MASS INDEX: 20.38 KG/M2 | DIASTOLIC BLOOD PRESSURE: 66 MMHG | SYSTOLIC BLOOD PRESSURE: 115 MMHG | WEIGHT: 115 LBS | HEIGHT: 63 IN

## 2023-05-04 PROCEDURE — 99214 OFFICE O/P EST MOD 30 MIN: CPT

## 2023-05-04 PROCEDURE — 93000 ELECTROCARDIOGRAM COMPLETE: CPT

## 2023-05-04 NOTE — DISCUSSION/SUMMARY
[FreeTextEntry1] : Roslyn reports episodes of dyspnea/dizziness, at the end of exertion, which sound to be blood pressure related.  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  She needs to stay hydrated.  We will see in a few weeks to see how she is feeling.  I favor eventually repeating an exercise stress test/echo to evaluate her blood pressure and LVOT gradients at the end of exertion (she did have similar testing in May 2022, though her symptoms are subdued at this time).\par \par She is willing to take Crestor every other day for her hyperlipidemia, and will have a repeat set of blood work in the office.\par \par We will speak in a few weeks to see how she is doing, and arrange follow-up.\par \par

## 2023-05-04 NOTE — PHYSICAL EXAM
[Well Developed] : well developed [Well Nourished] : well nourished [No Acute Distress] : no acute distress [Normal Conjunctiva] : normal conjunctiva [Normal Venous Pressure] : normal venous pressure [No Carotid Bruit] : no carotid bruit [Normal S1, S2] : normal S1, S2 [No Rub] : no rub [No Gallop] : no gallop [Clear Lung Fields] : clear lung fields [Good Air Entry] : good air entry [No Respiratory Distress] : no respiratory distress  [Soft] : abdomen soft [Non Tender] : non-tender [No Masses/organomegaly] : no masses/organomegaly [Normal Bowel Sounds] : normal bowel sounds [Normal Gait] : normal gait [No Edema] : no edema [No Cyanosis] : no cyanosis [No Clubbing] : no clubbing [No Varicosities] : no varicosities [No Rash] : no rash [No Skin Lesions] : no skin lesions [Moves all extremities] : moves all extremities [No Focal Deficits] : no focal deficits [Normal Speech] : normal speech [Alert and Oriented] : alert and oriented [Normal memory] : normal memory [de-identified] : + at [facility]

## 2023-05-04 NOTE — HISTORY OF PRESENT ILLNESS
[FreeTextEntry1] : Roslyn is a 72-year-old female here for a second opinion.\par \par She has a history of hypertension, and has previously been under the care of Dr. Mullins.  She last saw him in 4/2022.  She was reporting palpitations, and dyspnea on exertion, with episodes of near syncope, at the end of exertion.  The episodes have become more frequent, though did not bother her during the line dancing.  Because of her symptoms, she was set up for an echocardiogram, which was unremarkable, except for the presence of concentric LV remodeling, and moderate diastolic dysfunction.  She had a stress echocardiogram that was unremarkable.  She also had a weeklong ZIO, that demonstrated 3 episodes of SVT with the longest being 9 beats in duration.  There were rare APCs and PVCs.\par \par Her symptoms have continued, and she saw a cardiologist with [facility] earlier this year.  She had a carotid duplex which demonstrated no significant carotid disease.  Her LDL was 139.  She eventually was set up for a cardiac MRI, which demonstrated mild asymmetric hypertrophy of the basal anteroseptum/inferior/inferolateral/anterolateral segments, with a maximal thickness of 1.3 cm.  The LVEF was 66%.  The RV was normal.  There was no evidence of myocardial scar necrosis on delayed imaging.  There was evidence of a mid LV systolic outflow dynamic obstruction, due to chordal Filiberto.\par \par She was recommended to try a low-dose beta-blocker, though has been hesitant to do so.\par \par She continues to have intermittent symptoms with some dyspnea at the end of exertion, and episodes of dizziness.  She does have a history of orthostatic hypotension in the past.

## 2023-05-05 ENCOUNTER — TRANSCRIPTION ENCOUNTER (OUTPATIENT)
Age: 73
End: 2023-05-05

## 2023-05-08 LAB
ALBUMIN SERPL ELPH-MCNC: 4.4 G/DL
ALP BLD-CCNC: 48 U/L
ALT SERPL-CCNC: 18 U/L
ANION GAP SERPL CALC-SCNC: 11 MMOL/L
AST SERPL-CCNC: 23 U/L
BILIRUB SERPL-MCNC: 0.3 MG/DL
BUN SERPL-MCNC: 18 MG/DL
CALCIUM SERPL-MCNC: 9.6 MG/DL
CHLORIDE SERPL-SCNC: 104 MMOL/L
CHOLEST SERPL-MCNC: 145 MG/DL
CO2 SERPL-SCNC: 27 MMOL/L
CREAT SERPL-MCNC: 0.77 MG/DL
EGFR: 81 ML/MIN/1.73M2
GLUCOSE SERPL-MCNC: 85 MG/DL
HDLC SERPL-MCNC: 70 MG/DL
LDLC SERPL CALC-MCNC: 68 MG/DL
NONHDLC SERPL-MCNC: 76 MG/DL
POTASSIUM SERPL-SCNC: 4.8 MMOL/L
PROT SERPL-MCNC: 6.7 G/DL
SODIUM SERPL-SCNC: 142 MMOL/L
TRIGL SERPL-MCNC: 38 MG/DL

## 2023-06-05 ENCOUNTER — OUTPATIENT (OUTPATIENT)
Dept: OUTPATIENT SERVICES | Facility: HOSPITAL | Age: 73
LOS: 1 days | End: 2023-06-05
Payer: MEDICARE

## 2023-06-05 ENCOUNTER — APPOINTMENT (OUTPATIENT)
Dept: CT IMAGING | Facility: CLINIC | Age: 73
End: 2023-06-05
Payer: MEDICARE

## 2023-06-05 DIAGNOSIS — M19.012 PRIMARY OSTEOARTHRITIS, LEFT SHOULDER: Chronic | ICD-10-CM

## 2023-06-05 DIAGNOSIS — R18.8 OTHER ASCITES: ICD-10-CM

## 2023-06-05 PROCEDURE — 72193 CT PELVIS W/DYE: CPT

## 2023-06-05 PROCEDURE — 72193 CT PELVIS W/DYE: CPT | Mod: 26,MH

## 2023-07-06 ENCOUNTER — APPOINTMENT (OUTPATIENT)
Dept: CARDIOLOGY | Facility: CLINIC | Age: 73
End: 2023-07-06
Payer: MEDICARE

## 2023-07-06 ENCOUNTER — NON-APPOINTMENT (OUTPATIENT)
Age: 73
End: 2023-07-06

## 2023-07-06 VITALS
OXYGEN SATURATION: 98 % | BODY MASS INDEX: 20.38 KG/M2 | DIASTOLIC BLOOD PRESSURE: 69 MMHG | HEART RATE: 55 BPM | WEIGHT: 115 LBS | SYSTOLIC BLOOD PRESSURE: 110 MMHG | HEIGHT: 63 IN

## 2023-07-06 DIAGNOSIS — I42.2 OTHER HYPERTROPHIC CARDIOMYOPATHY: ICD-10-CM

## 2023-07-06 PROCEDURE — 99214 OFFICE O/P EST MOD 30 MIN: CPT

## 2023-07-06 PROCEDURE — 93000 ELECTROCARDIOGRAM COMPLETE: CPT

## 2023-07-06 NOTE — DISCUSSION/SUMMARY
[FreeTextEntry1] : Roslyn reports episodes of dyspnea/dizziness, at the end of exertion, which sound to be blood pressure related.  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  She needs to stay hydrated, though is not orthostatic today. She is going to have an exercise stress test to evaluate her blood pressure response to exertion and post-exertion. We will re-discuss the option of a low dose BB after test.\par \par She is willing to take Crestor every other day for her hyperlipidemia, and will have a repeat set of blood work in the office.\par \par We will speak after the above testing and arrange follow up. [EKG obtained to assist in diagnosis and management of assessed problem(s)] : EKG obtained to assist in diagnosis and management of assessed problem(s)

## 2023-07-06 NOTE — HISTORY OF PRESENT ILLNESS
[FreeTextEntry1] : Roslyn is a 72-year-old female here for follow up\par \par She has a history of hypertension, and has previously been under the care of Dr. Mullins.  She last saw him in 4/2022.  She was reporting palpitations, and dyspnea on exertion, with episodes of near syncope, at the end of exertion.  The episodes have become more frequent, though did not bother her during the line dancing.  Because of her symptoms, she was set up for an echocardiogram, which was unremarkable, except for the presence of concentric LV remodeling, and moderate diastolic dysfunction.  She had a stress echocardiogram that was unremarkable.  She also had a weeklong ZIO, that demonstrated 3 episodes of SVT with the longest being 9 beats in duration.  There were rare APCs and PVCs.\par \par Her symptoms have continued, and she saw a cardiologist with [facility] earlier this year.  She had a carotid duplex which demonstrated no significant carotid disease.  Her LDL was 139.  She eventually was set up for a cardiac MRI, which demonstrated mild asymmetric hypertrophy of the basal anteroseptum/inferior/inferolateral/anterolateral segments, with a maximal thickness of 1.3 cm.  The LVEF was 66%.  The RV was normal.  There was no evidence of myocardial scar necrosis on delayed imaging.  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 \par She continues to have intermittent symptoms with some dyspnea at the end of exertion, and episodes of dizziness.  She does have a history of orthostatic hypotension in the past.\par She went on vacation in [place] and had no issues.

## 2023-07-06 NOTE — PHYSICAL EXAM
[Well Developed] : well developed [Well Nourished] : well nourished [No Acute Distress] : no acute distress [Normal Conjunctiva] : normal conjunctiva [Normal Venous Pressure] : normal venous pressure [No Carotid Bruit] : no carotid bruit [Normal S1, S2] : normal S1, S2 [No Rub] : no rub [No Gallop] : no gallop [Clear Lung Fields] : clear lung fields [Good Air Entry] : good air entry [No Respiratory Distress] : no respiratory distress  [Soft] : abdomen soft [Non Tender] : non-tender [No Masses/organomegaly] : no masses/organomegaly [Normal Bowel Sounds] : normal bowel sounds [Normal Gait] : normal gait [No Edema] : no edema [No Cyanosis] : no cyanosis [No Clubbing] : no clubbing [No Varicosities] : no varicosities [No Rash] : no rash [No Skin Lesions] : no skin lesions [Moves all extremities] : moves all extremities [No Focal Deficits] : no focal deficits [Normal Speech] : normal speech [Alert and Oriented] : alert and oriented [Normal memory] : normal memory [de-identified] : + at [facility]

## 2023-07-07 LAB
ALBUMIN SERPL ELPH-MCNC: 4.4 G/DL
ALP BLD-CCNC: 45 U/L
ALT SERPL-CCNC: 15 U/L
ANION GAP SERPL CALC-SCNC: 9 MMOL/L
AST SERPL-CCNC: 21 U/L
BILIRUB SERPL-MCNC: 0.2 MG/DL
BUN SERPL-MCNC: 19 MG/DL
CALCIUM SERPL-MCNC: 9.4 MG/DL
CHLORIDE SERPL-SCNC: 105 MMOL/L
CHOLEST SERPL-MCNC: 170 MG/DL
CO2 SERPL-SCNC: 28 MMOL/L
CREAT SERPL-MCNC: 0.77 MG/DL
EGFR: 81 ML/MIN/1.73M2
GLUCOSE SERPL-MCNC: 87 MG/DL
HDLC SERPL-MCNC: 63 MG/DL
LDLC SERPL CALC-MCNC: 95 MG/DL
NONHDLC SERPL-MCNC: 107 MG/DL
POTASSIUM SERPL-SCNC: 4.7 MMOL/L
PROT SERPL-MCNC: 6.7 G/DL
SODIUM SERPL-SCNC: 142 MMOL/L
TRIGL SERPL-MCNC: 59 MG/DL

## 2023-07-10 ENCOUNTER — APPOINTMENT (OUTPATIENT)
Dept: SURGICAL ONCOLOGY | Facility: CLINIC | Age: 73
End: 2023-07-10
Payer: MEDICARE

## 2023-07-10 ENCOUNTER — RESULT REVIEW (OUTPATIENT)
Age: 73
End: 2023-07-10

## 2023-07-10 VITALS
HEART RATE: 65 BPM | OXYGEN SATURATION: 99 % | RESPIRATION RATE: 16 BRPM | SYSTOLIC BLOOD PRESSURE: 110 MMHG | DIASTOLIC BLOOD PRESSURE: 66 MMHG | HEIGHT: 62.5 IN | BODY MASS INDEX: 20.63 KG/M2 | WEIGHT: 115 LBS

## 2023-07-10 PROCEDURE — 99214 OFFICE O/P EST MOD 30 MIN: CPT

## 2023-07-10 NOTE — HISTORY OF PRESENT ILLNESS
[de-identified] : Ms. ROSLYN UNGER is a 72 year old woman here today for a follow-up visit for a pelvic cyst. \par \par The patient has a history of arthritis, herniated discs, spinal stenosis and has been seeing Dr. Blair for many years.  She was experiencing lower back pain, worse with prolonged sitting, and sought care with Dr. Blair. Pt. states she underwent an MRI of the lumbar spine which showed a partially visualized adnexal mass.  She was then referred for a Pelvic MRI which she completed on 3/1/2021 revealing a 6 x 5.5 cm nonspecific large cystic lesion in the posterior right pelvis ventral to the sacrum resulting in mass effect upon the adjacent bowel with a few thin internal septations but no locally aggressive MRI characteristics. No adenopathy. \par \par Past medical history notable for arthritis, anticardiolipin syndrome, right lattice degeneration, lupus anticoagulation disorder, orthostatic hypotension, palpitations, urethral prolapse (on Vagifem).  Past surgical history notable for , tonsillectomy, hysterectomy and shoulder surgery.   Pt. remains on ASA 81 mg daily. \par \par She had IR drainage of the cyst 2021 with negative cytology. \par \par CT pelvis from 2022 showed increase in size of persistent pelvic cyst.\par Pelvic cyst was against drained 2022 with negative cytology. (Post procedure note stated near complete decompression of cyst) Roslyn reported pain was much better after drainage, if pain or fluid collection reoccur, may need surgery. \par \par CT pelvis 2022 : Re demonstration of a cyst identified deep in the right pelvis today measuring 5.5 x 4.3 x 5.2 cm in size, previously measuring 5.4 x 6.4 x 5.5 cm\par Denies any accompanying symptoms, denies issues with urination or bowel, denies back pain. \par \par CT pelvis 2023- Cyst within the right lower pelvis/presacral space measuring 8.0 x 5.9 x 7.0 cm, previously measuring 5.4 x 6.4 x 5.5 cm. No nodular enhancement.\par \par I discussed with patient that we will continue to observe given that she is asymptomatic, and repeat the CT in 6 months.

## 2023-07-10 NOTE — ASSESSMENT
[FreeTextEntry1] : IMP:\par 71yo F with persistent stable asymptomatic pelvic cyst, \par \par CT pelvis 1/2022 showed increase of cyst\par s/p IR drainage 4/2021, negative cytology\par \par reoccurrence and drainage 2/2022, negative cytology, if pain or fluid collection reoccur, may need surgery. \par \par CT pelvis 6/2023 with interval enlargement of the known cyst, now measuring up to 8 cm.\par She remains asymptomatic. \par \par \par PLAN:\par Watchful waiting, RTO 6 months \par CT pelvis 12/2023 (ordered)\par RTO sooner if symptoms occur\par

## 2023-07-18 ENCOUNTER — APPOINTMENT (OUTPATIENT)
Dept: CARDIOLOGY | Facility: CLINIC | Age: 73
End: 2023-07-18
Payer: MEDICARE

## 2023-07-18 PROCEDURE — 93015 CV STRESS TEST SUPVJ I&R: CPT

## 2023-09-11 ENCOUNTER — APPOINTMENT (OUTPATIENT)
Dept: UROLOGY | Facility: CLINIC | Age: 73
End: 2023-09-11
Payer: MEDICARE

## 2023-09-11 PROCEDURE — 99213 OFFICE O/P EST LOW 20 MIN: CPT

## 2023-09-12 ENCOUNTER — APPOINTMENT (OUTPATIENT)
Dept: ORTHOPEDIC SURGERY | Facility: CLINIC | Age: 73
End: 2023-09-12
Payer: MEDICARE

## 2023-09-12 ENCOUNTER — NON-APPOINTMENT (OUTPATIENT)
Age: 73
End: 2023-09-12

## 2023-09-12 ENCOUNTER — APPOINTMENT (OUTPATIENT)
Dept: MRI IMAGING | Facility: CLINIC | Age: 73
End: 2023-09-12
Payer: MEDICARE

## 2023-09-12 VITALS — BODY MASS INDEX: 20.63 KG/M2 | WEIGHT: 115 LBS | HEIGHT: 62.5 IN

## 2023-09-12 DIAGNOSIS — N36.8 OTHER SPECIFIED DISORDERS OF URETHRA: ICD-10-CM

## 2023-09-12 DIAGNOSIS — S16.1XXA STRAIN OF MUSCLE, FASCIA AND TENDON AT NECK LEVEL, INITIAL ENCOUNTER: ICD-10-CM

## 2023-09-12 LAB
APPEARANCE: CLEAR
BACTERIA: NEGATIVE /HPF
BILIRUBIN URINE: NEGATIVE
BLOOD URINE: ABNORMAL
CAST: 0 /LPF
COLOR: YELLOW
EPITHELIAL CELLS: 2 /HPF
GLUCOSE QUALITATIVE U: NEGATIVE MG/DL
KETONES URINE: NEGATIVE MG/DL
LEUKOCYTE ESTERASE URINE: ABNORMAL
MICROSCOPIC-UA: NORMAL
NITRITE URINE: NEGATIVE
PH URINE: 7.5
PROTEIN URINE: NEGATIVE MG/DL
RED BLOOD CELLS URINE: 5 /HPF
SPECIFIC GRAVITY URINE: 1.02
UROBILINOGEN URINE: 0.2 MG/DL
WHITE BLOOD CELLS URINE: 17 /HPF

## 2023-09-12 PROCEDURE — 72141 MRI NECK SPINE W/O DYE: CPT | Mod: MH

## 2023-09-12 PROCEDURE — 72050 X-RAY EXAM NECK SPINE 4/5VWS: CPT

## 2023-09-12 PROCEDURE — 99214 OFFICE O/P EST MOD 30 MIN: CPT

## 2023-09-12 RX ORDER — FAMOTIDINE 20 MG/1
20 TABLET, FILM COATED ORAL
Qty: 90 | Refills: 0 | Status: ACTIVE | COMMUNITY
Start: 2023-04-25

## 2023-09-13 ENCOUNTER — NON-APPOINTMENT (OUTPATIENT)
Age: 73
End: 2023-09-13

## 2023-09-14 LAB — BACTERIA UR CULT: NORMAL

## 2023-09-26 ENCOUNTER — RX RENEWAL (OUTPATIENT)
Age: 73
End: 2023-09-26

## 2023-09-26 ENCOUNTER — APPOINTMENT (OUTPATIENT)
Dept: ORTHOPEDIC SURGERY | Facility: CLINIC | Age: 73
End: 2023-09-26
Payer: MEDICARE

## 2023-09-26 VITALS — WEIGHT: 115 LBS | HEIGHT: 62.5 IN | BODY MASS INDEX: 20.63 KG/M2

## 2023-09-26 DIAGNOSIS — M19.011 PRIMARY OSTEOARTHRITIS, RIGHT SHOULDER: ICD-10-CM

## 2023-09-26 DIAGNOSIS — M25.811 OTHER SPECIFIED JOINT DISORDERS, RIGHT SHOULDER: ICD-10-CM

## 2023-09-26 PROCEDURE — 99214 OFFICE O/P EST MOD 30 MIN: CPT

## 2023-09-26 PROCEDURE — 73030 X-RAY EXAM OF SHOULDER: CPT | Mod: RT

## 2023-09-26 RX ORDER — METHOCARBAMOL 500 MG/1
500 TABLET, FILM COATED ORAL
Qty: 60 | Refills: 1 | Status: COMPLETED | COMMUNITY
Start: 2023-09-12 | End: 2023-09-26

## 2023-09-26 RX ORDER — METOPROLOL SUCCINATE 25 MG/1
25 TABLET, EXTENDED RELEASE ORAL DAILY
Qty: 30 | Refills: 3 | Status: ACTIVE | COMMUNITY
Start: 2023-05-04 | End: 1900-01-01

## 2023-09-26 RX ORDER — HYDROCORTISONE 25 MG/G
2.5 OINTMENT TOPICAL
Qty: 20 | Refills: 0 | Status: COMPLETED | COMMUNITY
Start: 2017-06-09 | End: 2023-09-26

## 2023-10-05 ENCOUNTER — NON-APPOINTMENT (OUTPATIENT)
Age: 73
End: 2023-10-05

## 2023-10-05 ENCOUNTER — APPOINTMENT (OUTPATIENT)
Dept: CARDIOLOGY | Facility: CLINIC | Age: 73
End: 2023-10-05
Payer: MEDICARE

## 2023-10-05 VITALS
WEIGHT: 118 LBS | HEART RATE: 59 BPM | HEIGHT: 62.5 IN | BODY MASS INDEX: 21.17 KG/M2 | DIASTOLIC BLOOD PRESSURE: 61 MMHG | OXYGEN SATURATION: 97 % | SYSTOLIC BLOOD PRESSURE: 103 MMHG

## 2023-10-05 DIAGNOSIS — R06.00 DYSPNEA, UNSPECIFIED: ICD-10-CM

## 2023-10-05 PROCEDURE — ZZZZZ: CPT

## 2023-10-05 PROCEDURE — 99214 OFFICE O/P EST MOD 30 MIN: CPT

## 2023-10-05 PROCEDURE — 93000 ELECTROCARDIOGRAM COMPLETE: CPT

## 2023-10-18 LAB
ALBUMIN SERPL ELPH-MCNC: 4.6 G/DL
ALP BLD-CCNC: 50 U/L
ALT SERPL-CCNC: 16 U/L
ANION GAP SERPL CALC-SCNC: 6 MMOL/L
AST SERPL-CCNC: 22 U/L
BILIRUB SERPL-MCNC: 0.3 MG/DL
BUN SERPL-MCNC: 17 MG/DL
CALCIUM SERPL-MCNC: 9.4 MG/DL
CHLORIDE SERPL-SCNC: 104 MMOL/L
CHOLEST SERPL-MCNC: 159 MG/DL
CO2 SERPL-SCNC: 29 MMOL/L
CREAT SERPL-MCNC: 0.75 MG/DL
EGFR: 83 ML/MIN/1.73M2
GLUCOSE SERPL-MCNC: 84 MG/DL
HDLC SERPL-MCNC: 67 MG/DL
LDLC SERPL CALC-MCNC: 82 MG/DL
NONHDLC SERPL-MCNC: 92 MG/DL
POTASSIUM SERPL-SCNC: 4.7 MMOL/L
PROT SERPL-MCNC: 6.9 G/DL
SODIUM SERPL-SCNC: 140 MMOL/L
TRIGL SERPL-MCNC: 49 MG/DL

## 2023-10-24 ENCOUNTER — APPOINTMENT (OUTPATIENT)
Dept: CARDIOLOGY | Facility: CLINIC | Age: 73
End: 2023-10-24

## 2023-11-14 ENCOUNTER — APPOINTMENT (OUTPATIENT)
Dept: ORTHOPEDIC SURGERY | Facility: CLINIC | Age: 73
End: 2023-11-14
Payer: MEDICARE

## 2023-11-14 VITALS — WEIGHT: 118 LBS | BODY MASS INDEX: 21.17 KG/M2 | HEIGHT: 62.5 IN

## 2023-11-14 DIAGNOSIS — M18.11 UNILATERAL PRIMARY OSTEOARTHRITIS OF FIRST CARPOMETACARPAL JOINT, RIGHT HAND: ICD-10-CM

## 2023-11-14 DIAGNOSIS — S60.211A CONTUSION OF RIGHT WRIST, INITIAL ENCOUNTER: ICD-10-CM

## 2023-11-14 PROCEDURE — 99213 OFFICE O/P EST LOW 20 MIN: CPT

## 2023-11-14 PROCEDURE — 73110 X-RAY EXAM OF WRIST: CPT | Mod: RT

## 2023-11-17 LAB
APPEARANCE: CLEAR
BILIRUBIN URINE: NEGATIVE
BLOOD URINE: NEGATIVE
COLOR: YELLOW
GLUCOSE QUALITATIVE U: NEGATIVE MG/DL
KETONES URINE: NEGATIVE MG/DL
LEUKOCYTE ESTERASE URINE: NEGATIVE
NITRITE URINE: NEGATIVE
PH URINE: 7
PROTEIN URINE: NEGATIVE MG/DL
SPECIFIC GRAVITY URINE: 1.02
UROBILINOGEN URINE: 0.2 MG/DL

## 2023-11-19 LAB — BACTERIA UR CULT: NORMAL

## 2023-11-21 ENCOUNTER — APPOINTMENT (OUTPATIENT)
Dept: CARDIOLOGY | Facility: CLINIC | Age: 73
End: 2023-11-21

## 2023-11-22 ENCOUNTER — APPOINTMENT (OUTPATIENT)
Dept: ORTHOPEDIC SURGERY | Facility: CLINIC | Age: 73
End: 2023-11-22
Payer: COMMERCIAL

## 2023-11-22 VITALS — WEIGHT: 114 LBS | HEIGHT: 62.5 IN | BODY MASS INDEX: 20.45 KG/M2

## 2023-11-22 DIAGNOSIS — M51.26 OTHER INTERVERTEBRAL DISC DISPLACEMENT, LUMBAR REGION: ICD-10-CM

## 2023-11-22 DIAGNOSIS — M54.2 CERVICALGIA: ICD-10-CM

## 2023-11-22 PROCEDURE — 72110 X-RAY EXAM L-2 SPINE 4/>VWS: CPT

## 2023-11-22 PROCEDURE — 72040 X-RAY EXAM NECK SPINE 2-3 VW: CPT

## 2023-11-22 PROCEDURE — 99204 OFFICE O/P NEW MOD 45 MIN: CPT

## 2023-11-22 PROCEDURE — 72170 X-RAY EXAM OF PELVIS: CPT

## 2023-11-22 RX ORDER — TIZANIDINE 4 MG/1
4 TABLET ORAL
Qty: 40 | Refills: 0 | Status: ACTIVE | COMMUNITY
Start: 2023-11-22 | End: 1900-01-01

## 2023-12-23 ENCOUNTER — NON-APPOINTMENT (OUTPATIENT)
Age: 73
End: 2023-12-23

## 2023-12-28 ENCOUNTER — APPOINTMENT (OUTPATIENT)
Dept: CARDIOLOGY | Facility: CLINIC | Age: 73
End: 2023-12-28
Payer: MEDICARE

## 2023-12-28 ENCOUNTER — NON-APPOINTMENT (OUTPATIENT)
Age: 73
End: 2023-12-28

## 2023-12-28 VITALS
DIASTOLIC BLOOD PRESSURE: 69 MMHG | HEIGHT: 62.5 IN | OXYGEN SATURATION: 98 % | WEIGHT: 116 LBS | HEART RATE: 54 BPM | SYSTOLIC BLOOD PRESSURE: 106 MMHG | BODY MASS INDEX: 20.81 KG/M2

## 2023-12-28 DIAGNOSIS — R94.31 ABNORMAL ELECTROCARDIOGRAM [ECG] [EKG]: ICD-10-CM

## 2023-12-28 PROCEDURE — 93000 ELECTROCARDIOGRAM COMPLETE: CPT

## 2023-12-28 PROCEDURE — 99214 OFFICE O/P EST MOD 30 MIN: CPT

## 2023-12-28 NOTE — DISCUSSION/SUMMARY
Please call Dr. Renato Gibbs office tomorrow to schedule a follow-up appointment for further management and evaluation of black stools, please also call your primary care provider about this. This may include endoscopy or colonoscopy. Continue Carafate and iron as prescribed. Return to ER with any new or worsening symptoms including dizziness, lightheadedness or worsening weakness. Iron-Deficiency Anemia (Adult)  Red blood cells carry oxygen to the tissues of your body. Anemia is a condition in which you have too few red blood cells. You need iron to make red cells. Anemia makes you feel tired and run down. When anemia becomes severe, your skin becomes pale. You may feel short of breath after physical activity. Other symptoms include:  Â· Headaches  Â· Dizziness  Â· Leg cramps with physical activity  Â· Drowsiness  Your anemia is caused by not having enough iron in your body. This may be because of:  Â· Loss of blood. This can be caused by heavy menstrual periods. It can also be caused by bleeding from the stomach or intestines. Â· Poor diet. You may not be eating enough foods that contain iron. Â· Inability to absorb iron from the foods you eat  Â· Pregnancy  If your blood count is low enough, the doctor may prescribe an iron supplement. It usually takes about 2 to 3 months of treatment with iron supplements to correct an anemia. Severe cases of anemia need a blood transfusion to quickly ease symptoms and deliver more oxygen to the cells. Home care  Follow these guidelines when caring for yourself at home:  Â· Eat foods high in iron. This will boost the amount of iron stored in your body. It is a natural way to build up the number of blood cells. Good sources of iron include beef, liver, spinach and other dark green leafy vegetables, whole grains, beans, and nuts. Â· Do not overexert yourself. Â· Talk with your health care provider before traveling by air or traveling to high altitudes.   Follow-up care  Follow up with [FreeTextEntry1] : Roslyn reports episodes of dyspnea/dizziness, at the end of exertion, which sound to be blood pressure related.  A recent MRI demonstrated mild asymmetric hypertrophy of multiple segments, with evidence of a mild LV outflow tract obstruction due to to chordal Filiberto.  We had a long discussion regarding this finding.  She needs to stay hydrated, and has not been orthostatic. Her BP did not drop inappropriately during stress.  She is doing better with her current beta blocker dosing of 4x/week. This has balanced out her constipation noted at daily dosing. Her symptoms have improved, and are notably more tolerable.   She is willing to take Crestor every other day for her hyperlipidemia. your health care provider in 2 months, or as advised. This is to have another red blood cell count to be sure your anemia has been fixed. When to seek medical advice  Call your health care provider right awayÂ if any of these occur:  Â· Shortness of breath or chest pain  Â· Dizziness or fainting  Â· Vomiting blood or passing red or black-colored stoolÂ   Â© [id number] [place] [address] All rights reserved. This information is not intended as a substitute for professional medical care. Always follow your healthcare professional's instructions. [EKG obtained to assist in diagnosis and management of assessed problem(s)] : EKG obtained to assist in diagnosis and management of assessed problem(s)

## 2023-12-28 NOTE — PHYSICAL EXAM
[Well Developed] : well developed [Well Nourished] : well nourished [No Acute Distress] : no acute distress [Normal Conjunctiva] : normal conjunctiva [Normal Venous Pressure] : normal venous pressure [No Carotid Bruit] : no carotid bruit [Normal S1, S2] : normal S1, S2 [No Rub] : no rub [No Gallop] : no gallop [Clear Lung Fields] : clear lung fields [Good Air Entry] : good air entry [No Respiratory Distress] : no respiratory distress  [Soft] : abdomen soft [Non Tender] : non-tender [No Masses/organomegaly] : no masses/organomegaly [Normal Bowel Sounds] : normal bowel sounds [Normal Gait] : normal gait [No Edema] : no edema [No Cyanosis] : no cyanosis [No Clubbing] : no clubbing [No Varicosities] : no varicosities [No Rash] : no rash [No Skin Lesions] : no skin lesions [Moves all extremities] : moves all extremities [No Focal Deficits] : no focal deficits [Normal Speech] : normal speech [Alert and Oriented] : alert and oriented [Normal memory] : normal memory [de-identified] : + at [facility]

## 2023-12-28 NOTE — HISTORY OF PRESENT ILLNESS
[FreeTextEntry1] : Roslyn is a 73-year-old female here for follow up  She has a history of hypertension, and has previously been under the care of Dr. Mullins.  She last saw him in 4/2022.  She was reporting palpitations, and dyspnea on exertion, with episodes of near syncope, at the end of exertion.  The episodes have become more frequent, though did not bother her during the line dancing.  Because of her symptoms, she was set up for an echocardiogram, which was unremarkable, except for the presence of concentric LV remodeling, and moderate diastolic dysfunction.  She had a stress echocardiogram that was unremarkable.  She also had a weeklong ZIO, that demonstrated 3 episodes of SVT with the longest being 9 beats in duration.  There were rare APCs and PVCs.  Her symptoms have continued, and she saw a cardiologist with [facility] earlier this year.  She had a carotid duplex which demonstrated no significant carotid disease.  Her LDL was 139.  She eventually was set up for a cardiac MRI, which demonstrated mild asymmetric hypertrophy of the basal anteroseptum/inferior/inferolateral/anterolateral segments, with a maximal thickness of 1.3 cm.  The LVEF was 66%.  The RV was normal.  There was no evidence of myocardial scar necrosis on delayed imaging.  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 She was recommended to try a low-dose beta-blocker, though has been hesitant to do so.  I last saw her in Oct 2023 . At last visit, I set her up for an exercise stress test, during which she exercised for over 9 minutes, without worrisome EKG changes.  Her blood pressure went up appropriately.  She did have two 6 beat episodes of SVT. She agreed to start low-dose metoprolol, which has initially made her feel better. She reports notable constipation since starting this.  Overall, she is doing ok though continues to have intermittent symptoms with some dyspnea at the end of exertion, and episodes of dizziness.  She does have a history of orthostatic hypotension in the past. Her LDL was 72, taking statin every other day. She is taking Toprol XL 12.5 4x/week. This seems to have helped her constipation (versus taking it daily).

## 2024-01-02 ENCOUNTER — APPOINTMENT (OUTPATIENT)
Dept: OPHTHALMOLOGY | Facility: CLINIC | Age: 74
End: 2024-01-02
Payer: MEDICARE

## 2024-01-02 ENCOUNTER — NON-APPOINTMENT (OUTPATIENT)
Age: 74
End: 2024-01-02

## 2024-01-02 PROCEDURE — 92014 COMPRE OPH EXAM EST PT 1/>: CPT

## 2024-01-22 ENCOUNTER — APPOINTMENT (OUTPATIENT)
Dept: ORTHOPEDIC SURGERY | Facility: CLINIC | Age: 74
End: 2024-01-22
Payer: MEDICARE

## 2024-01-22 VITALS — HEIGHT: 62.5 IN | BODY MASS INDEX: 20.81 KG/M2 | WEIGHT: 116 LBS

## 2024-01-22 PROCEDURE — 99213 OFFICE O/P EST LOW 20 MIN: CPT

## 2024-01-22 NOTE — HISTORY OF PRESENT ILLNESS
[Gradual] : gradual [3] : 3 [Dull/Aching] : dull/aching [Intermittent] : intermittent [Physical therapy] : physical therapy [de-identified] : The patient has been going to physical therapy.  Doing home exercises.  She has mild pain and stiffness in her neck with movement.  No radicular complaints.  No awakening from sleep at night She has mild occasional pain in her lower back with change in position.  Pain when bending and sitting.  Pain when standing and walking.  No radicular complaints No numbness or weakness in upper or lower extremities. [] : Post Surgical Visit: no [de-identified] : Dr. Blair [de-identified] : 9/26/23 [de-identified] : 1/18/24

## 2024-01-22 NOTE — IMAGING
[de-identified] : Cervical spine Inspection normal Mild tenderness trapezius on the right Mildly decreased active range of motion Negative foraminal closing test bilaterally Motor exam upper extremities-normal  Shoulders Full painless active range of motion Radial pulse 2+ bilaterally  Normal gait  Lumbosacral spine Inspection-normal Tenderness-mild tenderness paraspinals bilaterally Straight leg raising-negative bilaterally Motor exam lower extremities-normal  Right hip Full painless passive range of motion. No tenderness  Left hip Full painless passive range of motion. No tenderness  Both legs No swelling Calves are soft and nontender Posterior tibial pulse 2+ bilaterally

## 2024-01-22 NOTE — DISCUSSION/SUMMARY
[de-identified] : Tylenol as needed.  Unable to take NSAIDs because of sensitive stomach and history of ulcers Methocarbamol 500 mg 1-2 every 8 hours as needed pain/spasm.  Not to take this if she is driving and needs to be alert Continue PT at [facility] Moist heat prn  Impression: Cervical strain/spondylosis Lumbosacral strain/spondylosis [Medication Risks Reviewed] : Medication risks reviewed

## 2024-01-29 ENCOUNTER — OUTPATIENT (OUTPATIENT)
Dept: OUTPATIENT SERVICES | Facility: HOSPITAL | Age: 74
LOS: 1 days | End: 2024-01-29
Payer: MEDICARE

## 2024-01-29 ENCOUNTER — APPOINTMENT (OUTPATIENT)
Dept: CT IMAGING | Facility: CLINIC | Age: 74
End: 2024-01-29
Payer: MEDICARE

## 2024-01-29 DIAGNOSIS — R19.00 INTRA-ABDOMINAL AND PELVIC SWELLING, MASS AND LUMP, UNSPECIFIED SITE: ICD-10-CM

## 2024-01-29 DIAGNOSIS — M19.012 PRIMARY OSTEOARTHRITIS, LEFT SHOULDER: Chronic | ICD-10-CM

## 2024-01-29 PROCEDURE — 72193 CT PELVIS W/DYE: CPT | Mod: 26,MH

## 2024-01-29 PROCEDURE — 72193 CT PELVIS W/DYE: CPT

## 2024-02-05 ENCOUNTER — NON-APPOINTMENT (OUTPATIENT)
Age: 74
End: 2024-02-05

## 2024-02-09 ENCOUNTER — APPOINTMENT (OUTPATIENT)
Dept: SURGICAL ONCOLOGY | Facility: CLINIC | Age: 74
End: 2024-02-09
Payer: MEDICARE

## 2024-02-09 ENCOUNTER — APPOINTMENT (OUTPATIENT)
Dept: CARDIOLOGY | Facility: CLINIC | Age: 74
End: 2024-02-09

## 2024-02-09 VITALS
HEART RATE: 64 BPM | SYSTOLIC BLOOD PRESSURE: 115 MMHG | RESPIRATION RATE: 16 BRPM | OXYGEN SATURATION: 99 % | BODY MASS INDEX: 20.45 KG/M2 | WEIGHT: 114 LBS | DIASTOLIC BLOOD PRESSURE: 60 MMHG | HEIGHT: 62.5 IN

## 2024-02-09 DIAGNOSIS — R19.00 INTRA-ABDOMINAL AND PELVIC SWELLING, MASS AND LUMP, UNSPECIFIED SITE: ICD-10-CM

## 2024-02-09 PROCEDURE — 99214 OFFICE O/P EST MOD 30 MIN: CPT

## 2024-02-09 NOTE — HISTORY OF PRESENT ILLNESS
[de-identified] : Ms. ROSLYN UNGER is a 73 year old woman here today for a follow-up visit for a pelvic cyst.   The patient has a history of arthritis, herniated discs, spinal stenosis and has been seeing Dr. Blair for many years.  She was experiencing lower back pain, worse with prolonged sitting, and sought care with Dr. Blair. Pt. states she underwent an MRI of the lumbar spine which showed a partially visualized adnexal mass.  She was then referred for a Pelvic MRI which she completed on 3/1/2021 revealing a 6 x 5.5 cm nonspecific large cystic lesion in the posterior right pelvis ventral to the sacrum resulting in mass effect upon the adjacent bowel with a few thin internal septations but no locally aggressive MRI characteristics. No adenopathy.   Past medical history notable for arthritis, anticardiolipin syndrome, right lattice degeneration, lupus anticoagulation disorder, orthostatic hypotension, palpitations, urethral prolapse (on Vagifem).  Past surgical history notable for , tonsillectomy, hysterectomy and shoulder surgery.   Pt. remains on ASA 81 mg daily.   She had IR drainage of the cyst 2021 with negative cytology.   CT pelvis from 2022 showed increase in size of persistent pelvic cyst. Pelvic cyst was against drained 2022 with negative cytology. (Post procedure note stated near complete decompression of cyst) Roslyn reported pain was much better after drainage, if pain or fluid collection reoccur, may need surgery.   CT pelvis 2022 : Re demonstration of a cyst identified deep in the right pelvis today measuring 5.5 x 4.3 x 5.2 cm in size, previously measuring 5.4 x 6.4 x 5.5 cm Denies any accompanying symptoms, denies issues with urination or bowel, denies back pain.   CT pelvis 2023- Cyst within the right lower pelvis/presacral space measuring 8.0 x 5.9 x 7.0 cm, previously measuring 5.4 x 6.4 x 5.5 cm. No nodular enhancement.  CT pelvis 24: Interval slight increase in size of a right lower pelvis/presacral cyst, now measuring up to 8.4 cm x 7.5, previously measuring 8.0 x 6.1 cm.  some urinary frequency and mild irregular bowel habits but not enough to be troublesome

## 2024-02-09 NOTE — ASSESSMENT
[FreeTextEntry1] : IMP: 73yo F with persistent stable asymptomatic pelvic cyst,   CT pelvis 1/2022 showed increase of cyst s/p IR drainage 4/2021, negative cytology  reoccurrence and drainage 2/2022, negative cytology, if pain or fluid collection reoccur, may need surgery.   CT pelvis 6/2023 with interval enlargement of the known cyst, now measuring up to 8 cm. She remains asymptomatic.  CT pelvis 1/29/24: Interval slight increase in size of a right lower pelvis/presacral cyst, now measuring up to 8.4 cm x 7.5, previously measuring 8.0 x 6.1 cm. Pancreatic cysts   PLAN: no surgery at this time RTO 6 months with repeat CT MRI now of pancreatic cysts

## 2024-02-09 NOTE — PHYSICAL EXAM
[Normal] : supple, no neck mass and thyroid not enlarged [Normal Supraclavicular Lymph Nodes] : normal supraclavicular lymph nodes [Normal Groin Lymph Nodes] : normal groin lymph nodes [Normal] : oriented to person, place and time, with appropriate affect [de-identified] : pelvic fullness but no true mass palpable

## 2024-03-12 ENCOUNTER — NON-APPOINTMENT (OUTPATIENT)
Age: 74
End: 2024-03-12

## 2024-03-14 DIAGNOSIS — M47.816 SPONDYLOSIS W/OUT MYELOPATHY OR RADICULOPATHY, LUMBAR REGION: ICD-10-CM

## 2024-03-26 NOTE — ASU PATIENT PROFILE, ADULT - NS TRANSFER EYEGLASSES PAIRS
1 pair

## 2024-03-28 ENCOUNTER — NON-APPOINTMENT (OUTPATIENT)
Age: 74
End: 2024-03-28

## 2024-03-29 ENCOUNTER — OUTPATIENT (OUTPATIENT)
Dept: OUTPATIENT SERVICES | Facility: HOSPITAL | Age: 74
LOS: 1 days | End: 2024-03-29
Payer: MEDICARE

## 2024-03-29 DIAGNOSIS — R09.89 OTHER SPECIFIED SYMPTOMS AND SIGNS INVOLVING THE CIRCULATORY AND RESPIRATORY SYSTEMS: ICD-10-CM

## 2024-03-29 DIAGNOSIS — M19.012 PRIMARY OSTEOARTHRITIS, LEFT SHOULDER: Chronic | ICD-10-CM

## 2024-03-29 DIAGNOSIS — R13.10 DYSPHAGIA, UNSPECIFIED: ICD-10-CM

## 2024-03-29 PROCEDURE — A9698: CPT

## 2024-03-29 PROCEDURE — 74230 X-RAY XM SWLNG FUNCJ C+: CPT | Mod: 26

## 2024-03-29 PROCEDURE — 74230 X-RAY XM SWLNG FUNCJ C+: CPT

## 2024-03-29 PROCEDURE — 92611 MOTION FLUOROSCOPY/SWALLOW: CPT

## 2024-04-15 ENCOUNTER — NON-APPOINTMENT (OUTPATIENT)
Age: 74
End: 2024-04-15

## 2024-04-15 ENCOUNTER — APPOINTMENT (OUTPATIENT)
Dept: CARDIOLOGY | Facility: CLINIC | Age: 74
End: 2024-04-15
Payer: MEDICARE

## 2024-04-15 VITALS
BODY MASS INDEX: 20.38 KG/M2 | DIASTOLIC BLOOD PRESSURE: 57 MMHG | HEART RATE: 55 BPM | WEIGHT: 115 LBS | OXYGEN SATURATION: 96 % | HEIGHT: 63 IN | SYSTOLIC BLOOD PRESSURE: 93 MMHG

## 2024-04-15 VITALS — SYSTOLIC BLOOD PRESSURE: 105 MMHG | DIASTOLIC BLOOD PRESSURE: 62 MMHG

## 2024-04-15 DIAGNOSIS — R00.2 PALPITATIONS: ICD-10-CM

## 2024-04-15 DIAGNOSIS — R42 DIZZINESS AND GIDDINESS: ICD-10-CM

## 2024-04-15 PROCEDURE — 99214 OFFICE O/P EST MOD 30 MIN: CPT

## 2024-04-15 PROCEDURE — 93000 ELECTROCARDIOGRAM COMPLETE: CPT

## 2024-04-15 NOTE — HISTORY OF PRESENT ILLNESS
[FreeTextEntry1] : Roslyn is a 73-year-old female here for follow up  She has a history of hypertension, and has previously been under the care of Dr. Mullins.  She last saw him in 4/2022.  She was reporting palpitations, and dyspnea on exertion, with episodes of near syncope, at the end of exertion.  The episodes have become more frequent, though did not bother her during the line dancing.  Because of her symptoms, she was set up for an echocardiogram, which was unremarkable, except for the presence of concentric LV remodeling, and moderate diastolic dysfunction.  She had a stress echocardiogram that was unremarkable.  She also had a weeklong ZIO, that demonstrated 3 episodes of SVT with the longest being 9 beats in duration.  There were rare APCs and PVCs.  Her symptoms have continued, and she saw a cardiologist with [facility] earlier this year.  She had a carotid duplex which demonstrated no significant carotid disease.  Her LDL was 139.  She eventually was set up for a cardiac MRI, which demonstrated mild asymmetric hypertrophy of the basal anteroseptum/inferior/inferolateral/anterolateral segments, with a maximal thickness of 1.3 cm.  The LVEF was 66%.  The RV was normal.  There was no evidence of myocardial scar necrosis on delayed imaging.  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 She was recommended to try a low-dose beta-blocker, though has been hesitant to do so.  I last saw her in  Dec 2023 . In 2023, I set her up for an exercise stress test, during which she exercised for over 9 minutes, without worrisome EKG changes.  Her blood pressure went up appropriately.  She did have two 6 beat episodes of SVT. She agreed to start low-dose metoprolol, which has initially made her feel better. She reports notable constipation since starting this.  Overall, she is doing ok though continues to have intermittent symptoms with some dyspnea at the end of exertion, and episodes of dizziness.  She does have a history of orthostatic hypotension in the past. Her LDL was 72, taking statin every other day. She is taking Toprol XL 12.5 4x/week. This seems to have helped her constipation (versus taking it daily).

## 2024-04-15 NOTE — PHYSICAL EXAM
[Well Developed] : well developed [Well Nourished] : well nourished [No Acute Distress] : no acute distress [Normal Conjunctiva] : normal conjunctiva [Normal Venous Pressure] : normal venous pressure [No Carotid Bruit] : no carotid bruit [Normal S1, S2] : normal S1, S2 [No Rub] : no rub [No Gallop] : no gallop [Clear Lung Fields] : clear lung fields [Good Air Entry] : good air entry [No Respiratory Distress] : no respiratory distress  [Soft] : abdomen soft [Non Tender] : non-tender [No Masses/organomegaly] : no masses/organomegaly [Normal Bowel Sounds] : normal bowel sounds [Normal Gait] : normal gait [No Edema] : no edema [No Cyanosis] : no cyanosis [No Clubbing] : no clubbing [No Varicosities] : no varicosities [No Rash] : no rash [No Skin Lesions] : no skin lesions [Moves all extremities] : moves all extremities [No Focal Deficits] : no focal deficits [Normal Speech] : normal speech [Alert and Oriented] : alert and oriented [Normal memory] : normal memory [de-identified] : + at [facility]

## 2024-04-15 NOTE — DISCUSSION/SUMMARY
[EKG obtained to assist in diagnosis and management of assessed problem(s)] : EKG obtained to assist in diagnosis and management of assessed problem(s) [FreeTextEntry1] : Roslyn reports episodes of dyspnea/dizziness, at the end of exertion, which sound to be blood pressure related.  A recent MRI demonstrated mild asymmetric hypertrophy of multiple segments, with evidence of a mild LV outflow tract obstruction due to to chordal Filiberto.  We had a long discussion regarding this finding.  She needs to stay hydrated, and has not been orthostatic (she is not orthostatic today). Her BP did not drop inappropriately during stress.  She is doing better with her current beta blocker dosing of 4x/week, though will try it 5x. This has balanced out her constipation noted at daily dosing. Her symptoms have improved, and are notably more tolerable.  I am going to repeat an echocardiogram to see her LVOT gradients.  She is willing to take Crestor every other day for her hyperlipidemia.  If no issues, I will see her again in 3 months.

## 2024-04-26 ENCOUNTER — APPOINTMENT (OUTPATIENT)
Dept: CARDIOLOGY | Facility: CLINIC | Age: 74
End: 2024-04-26
Payer: MEDICARE

## 2024-04-26 PROCEDURE — 93306 TTE W/DOPPLER COMPLETE: CPT

## 2024-04-26 RX ORDER — PERFLUTREN 6.52 MG/ML
6.52 INJECTION, SUSPENSION INTRAVENOUS
Qty: 1 | Refills: 0 | Status: COMPLETED | OUTPATIENT
Start: 2024-04-26

## 2024-04-26 RX ADMIN — PERFLUTREN MG/ML: 6.52 INJECTION, SUSPENSION INTRAVENOUS at 00:00

## 2024-05-03 ENCOUNTER — APPOINTMENT (OUTPATIENT)
Dept: RADIOLOGY | Facility: HOSPITAL | Age: 74
End: 2024-05-03

## 2024-05-03 ENCOUNTER — TRANSCRIPTION ENCOUNTER (OUTPATIENT)
Age: 74
End: 2024-05-03

## 2024-05-03 LAB
ALBUMIN SERPL ELPH-MCNC: 4.6 G/DL
ALP BLD-CCNC: 51 U/L
ALT SERPL-CCNC: 17 U/L
ANION GAP SERPL CALC-SCNC: 11 MMOL/L
AST SERPL-CCNC: 25 U/L
BILIRUB SERPL-MCNC: 0.3 MG/DL
BUN SERPL-MCNC: 21 MG/DL
CALCIUM SERPL-MCNC: 9.6 MG/DL
CHLORIDE SERPL-SCNC: 104 MMOL/L
CHOLEST SERPL-MCNC: 176 MG/DL
CO2 SERPL-SCNC: 25 MMOL/L
CREAT SERPL-MCNC: 0.78 MG/DL
EGFR: 80 ML/MIN/1.73M2
GLUCOSE SERPL-MCNC: 79 MG/DL
HCT VFR BLD CALC: 37.2 %
HDLC SERPL-MCNC: 73 MG/DL
HGB BLD-MCNC: 12.7 G/DL
LDLC SERPL CALC-MCNC: 92 MG/DL
MCHC RBC-ENTMCNC: 31.4 PG
MCHC RBC-ENTMCNC: 34.1 GM/DL
MCV RBC AUTO: 92.1 FL
NONHDLC SERPL-MCNC: 103 MG/DL
PLATELET # BLD AUTO: 253 K/UL
POTASSIUM SERPL-SCNC: 5.4 MMOL/L
PROT SERPL-MCNC: 7 G/DL
RBC # BLD: 4.04 M/UL
RBC # FLD: 13.3 %
SODIUM SERPL-SCNC: 140 MMOL/L
TRIGL SERPL-MCNC: 54 MG/DL
WBC # FLD AUTO: 4.29 K/UL

## 2024-05-14 ENCOUNTER — APPOINTMENT (OUTPATIENT)
Dept: ORTHOPEDIC SURGERY | Facility: CLINIC | Age: 74
End: 2024-05-14
Payer: MEDICARE

## 2024-05-14 VITALS — HEIGHT: 63 IN

## 2024-05-14 DIAGNOSIS — M47.812 SPONDYLOSIS W/OUT MYELOPATHY OR RADICULOPATHY, CERVICAL REGION: ICD-10-CM

## 2024-05-14 DIAGNOSIS — S16.1XXD STRAIN OF MUSCLE, FASCIA AND TENDON AT NECK LEVEL, SUBSEQUENT ENCOUNTER: ICD-10-CM

## 2024-05-14 PROCEDURE — 99214 OFFICE O/P EST MOD 30 MIN: CPT

## 2024-05-14 NOTE — DISCUSSION/SUMMARY
[Medication Risks Reviewed] : Medication risks reviewed [de-identified] : Tylenol as needed.  Unable to take NSAIDs because of sensitive stomach and history of ulcers Methocarbamol 500 mg 1-2 every 8 hours as needed pain/spasm.  Not to take this if she is driving and needs to be alert Continue PT at [facility].  The program is added for her left hip Moist heat prn  Impression: Cervical strain/spondylosis Lumbosacral strain/spondylosis Abductor tendinitis left hip

## 2024-05-14 NOTE — REASON FOR VISIT
[FreeTextEntry2] : Mild neck and lower back pain Pain left lateral hip over the past couple of weeks

## 2024-05-14 NOTE — HISTORY OF PRESENT ILLNESS
[Gradual] : gradual [3] : 3 [Dull/Aching] : dull/aching [Intermittent] : intermittent [Physical therapy] : physical therapy [Retired] : Work status: retired [de-identified] : The patient has been going to physical therapy.  Doing home exercises.  She has minimal pain and stiffness in her neck with movement.  No radicular complaints.  No awakening from sleep at night She has mild occasional pain in her lower back with change in position.  Pain when bending and sitting.  Pain when standing and walking.  No radicular complaints No numbness or weakness in upper or lower extremities. Over the past couple of weeks, she has had pain left lateral hip.  No injury.  She has mild occasional pain standing and walking.  Pain after sitting and getting up.  Pain gets better as the day goes on. [] : Post Surgical Visit: no [de-identified] : 1/22/2024 [de-identified] : Dr. Blair [de-identified] : 1/18/24

## 2024-05-14 NOTE — IMAGING
[Bilateral] : hip with pelvis bilaterally [de-identified] : Cervical spine Inspection normal Slight tenderness trapezius on the right Mildly decreased active range of motion Negative foraminal closing test bilaterally Motor exam upper extremities-normal  Shoulders Full painless active range of motion Radial pulse 2+ bilaterally  Normal gait  Lumbosacral spine Inspection-normal Tenderness-slight tenderness paraspinals bilaterally Straight leg raising-negative bilaterally Motor exam lower extremities-normal  Right hip Full painless passive range of motion. No tenderness  Left hip No swelling Mild tenderness over the abductor insertion Full painless passive range of motion\  Both legs No swelling Calves are soft and nontender Posterior tibial pulse 2+ bilaterally  [FreeTextEntry9] : Reviewed and interpreted.  Pelvis AP done November 22, 2023.  Well-circumscribed area of calcification/ossification left proximal femur, known enchondroma

## 2024-05-30 ENCOUNTER — APPOINTMENT (OUTPATIENT)
Dept: MRI IMAGING | Facility: CLINIC | Age: 74
End: 2024-05-30
Payer: MEDICARE

## 2024-05-30 PROCEDURE — 73721 MRI JNT OF LWR EXTRE W/O DYE: CPT | Mod: LT

## 2024-06-10 ENCOUNTER — NON-APPOINTMENT (OUTPATIENT)
Age: 74
End: 2024-06-10

## 2024-06-12 ENCOUNTER — APPOINTMENT (OUTPATIENT)
Dept: ORTHOPEDIC SURGERY | Facility: CLINIC | Age: 74
End: 2024-06-12
Payer: MEDICARE

## 2024-06-12 VITALS — BODY MASS INDEX: 20.38 KG/M2 | WEIGHT: 115 LBS | HEIGHT: 63 IN

## 2024-06-12 DIAGNOSIS — M70.62 TROCHANTERIC BURSITIS, LEFT HIP: ICD-10-CM

## 2024-06-12 DIAGNOSIS — M47.817 SPONDYLOSIS W/OUT MYELOPATHY OR RADICULOPATHY, LUMBOSACRAL REGION: ICD-10-CM

## 2024-06-12 DIAGNOSIS — S39.012D STRAIN OF MUSCLE, FASCIA AND TENDON OF LOWER BACK, SUBSEQUENT ENCOUNTER: ICD-10-CM

## 2024-06-12 PROCEDURE — 99214 OFFICE O/P EST MOD 30 MIN: CPT

## 2024-06-12 NOTE — DISCUSSION/SUMMARY
[Medication Risks Reviewed] : Medication risks reviewed [de-identified] : Tylenol as needed.  Unable to take NSAIDs because of sensitive stomach and history of ulcers Methocarbamol 500 mg 1-2 every 8 hours as needed pain/spasm.  Not to take this if she is driving and needs to be alert Continue PT at [facility] The patient will contact her urologist Dr. Pimentel to see if any further evaluation is needed for urinary bladder abnormality noted on MRI She is followed on a regular basis for pelvic cyst by Dr. Leobardo Mcconnell.  She is scheduled to have follow-up scan of her pelvis in July I discussed possible cortisone injection left hip if ongoing symptoms  Impression: Lumbosacral strain/spondylosis Abductor tendinitis left hip not evaluated

## 2024-06-12 NOTE — DATA REVIEWED
[MRI] : MRI [Left] : left [Hip] : hip [I independently reviewed and interpreted images and report] : I independently reviewed and interpreted images and report [FreeTextEntry1] : MRI left hip done May 30, 2024 was reviewed. There are moderate degenerative changes of the hip with effusion. Increased signal changes abductor tendons with bursitis. Also reported as pelvic lesion measuring 9 cm x 7.8 cm x 9.4 cm. 10 mm enchondroma within the proximal femoral diaphysis with no marrow edema or fracture. Nonspecific distention of the urinary bladder. Correlate with patient's clinical history

## 2024-06-12 NOTE — HISTORY OF PRESENT ILLNESS
[Gradual] : gradual [3] : 3 [Dull/Aching] : dull/aching [Intermittent] : intermittent [Physical therapy] : physical therapy [Retired] : Work status: retired [de-identified] : The patient has continued mild pain in her lower back with change in position.  Pain bending and sitting.  Pain standing and walking.  Occasional pain radiating to her left leg She has mild to moderate pain left lateral hip after sitting and getting up.  She feels better after she walks.  She has mild occasional pain standing and walking.  Occasional awakening from sleep at night when lying on her side.  She had MRI lumbosacral spine. [] : Post Surgical Visit: no [FreeTextEntry1] : L Hip [de-identified] : 5/14/2024 [de-identified] : Dr. Blair [de-identified] : MRI

## 2024-06-12 NOTE — IMAGING
[Bilateral] : hip with pelvis bilaterally [de-identified] : Normal gait  Lumbosacral spine Inspection-normal Tenderness-slight tenderness paraspinals bilaterally Straight leg raising-negative bilaterally Motor exam lower extremities-left quads and abductors 4+/5, remainder motor lower extremities normal  Right hip Full painless passive range of motion. No tenderness  Left hip No swelling Mild tenderness over the abductor insertion Full painless passive range of motion   Both legs No swelling Calves are soft and nontender Posterior tibial pulse 2+ bilaterally  [FreeTextEntry9] : Reviewed and interpreted.  Pelvis AP done November 22, 2023.  Well-circumscribed area of calcification/ossification left proximal femur, known enchondroma

## 2024-07-08 ENCOUNTER — APPOINTMENT (OUTPATIENT)
Dept: CT IMAGING | Facility: CLINIC | Age: 74
End: 2024-07-08
Payer: MEDICARE

## 2024-07-08 ENCOUNTER — OUTPATIENT (OUTPATIENT)
Dept: OUTPATIENT SERVICES | Facility: HOSPITAL | Age: 74
LOS: 1 days | End: 2024-07-08
Payer: MEDICARE

## 2024-07-08 DIAGNOSIS — R19.00 INTRA-ABDOMINAL AND PELVIC SWELLING, MASS AND LUMP, UNSPECIFIED SITE: ICD-10-CM

## 2024-07-08 DIAGNOSIS — M19.012 PRIMARY OSTEOARTHRITIS, LEFT SHOULDER: Chronic | ICD-10-CM

## 2024-07-08 PROCEDURE — 72193 CT PELVIS W/DYE: CPT | Mod: 26,MH

## 2024-07-08 PROCEDURE — 72193 CT PELVIS W/DYE: CPT

## 2024-07-11 ENCOUNTER — NON-APPOINTMENT (OUTPATIENT)
Age: 74
End: 2024-07-11

## 2024-07-16 ENCOUNTER — APPOINTMENT (OUTPATIENT)
Dept: CARDIOLOGY | Facility: CLINIC | Age: 74
End: 2024-07-16
Payer: MEDICARE

## 2024-07-16 ENCOUNTER — NON-APPOINTMENT (OUTPATIENT)
Age: 74
End: 2024-07-16

## 2024-07-16 VITALS
WEIGHT: 115 LBS | SYSTOLIC BLOOD PRESSURE: 106 MMHG | HEART RATE: 60 BPM | DIASTOLIC BLOOD PRESSURE: 66 MMHG | OXYGEN SATURATION: 98 % | HEIGHT: 63 IN | BODY MASS INDEX: 20.38 KG/M2

## 2024-07-16 DIAGNOSIS — I42.2 OTHER HYPERTROPHIC CARDIOMYOPATHY: ICD-10-CM

## 2024-07-16 DIAGNOSIS — R06.00 DYSPNEA, UNSPECIFIED: ICD-10-CM

## 2024-07-16 PROCEDURE — 93000 ELECTROCARDIOGRAM COMPLETE: CPT

## 2024-07-16 PROCEDURE — 99214 OFFICE O/P EST MOD 30 MIN: CPT

## 2024-07-24 ENCOUNTER — APPOINTMENT (OUTPATIENT)
Dept: ORTHOPEDIC SURGERY | Facility: CLINIC | Age: 74
End: 2024-07-24
Payer: MEDICARE

## 2024-07-24 VITALS — HEIGHT: 63 IN | WEIGHT: 115 LBS | BODY MASS INDEX: 20.38 KG/M2

## 2024-07-24 DIAGNOSIS — M47.817 SPONDYLOSIS W/OUT MYELOPATHY OR RADICULOPATHY, LUMBOSACRAL REGION: ICD-10-CM

## 2024-07-24 DIAGNOSIS — S39.012D STRAIN OF MUSCLE, FASCIA AND TENDON OF LOWER BACK, SUBSEQUENT ENCOUNTER: ICD-10-CM

## 2024-07-24 DIAGNOSIS — M70.62 TROCHANTERIC BURSITIS, LEFT HIP: ICD-10-CM

## 2024-07-24 PROCEDURE — 99213 OFFICE O/P EST LOW 20 MIN: CPT

## 2024-07-24 NOTE — DISCUSSION/SUMMARY
[Medication Risks Reviewed] : Medication risks reviewed [de-identified] : Tylenol as needed.  Unable to take NSAIDs because of sensitive stomach and history of ulcers Methocarbamol 500 mg 1-2 every 8 hours as needed pain/spasm.  Not to take this if she is driving and needs to be alert Risk benefits and alternatives of prescribed medication(s) were discussed with the patient.  Side effects were discussed and questions were answered.  Discontinue medication if any issues.  To call me if any questions or concerns Continue PT at [facility] She spoke with her urologist Dr. Pimentel to see if any further evaluation is needed for urinary bladder abnormality noted on MRI.  No further evaluation was needed She is followed on a regular basis for pelvic cyst by Dr. Leobardo Mcconnell.  She had follow-up CT scan.  She will have repeat scan in 6 months  Impression: Lumbosacral strain/spondylosis Abductor tendinitis left hip

## 2024-07-24 NOTE — HISTORY OF PRESENT ILLNESS
[Gradual] : gradual [3] : 3 [Dull/Aching] : dull/aching [Intermittent] : intermittent [Physical therapy] : physical therapy [Retired] : Work status: retired [de-identified] : The patient has continued mild pain in her lower back with change in position.  Pain bending and sitting.  Pain after sitting and getting up.  No radicular complaints She has mild to moderate pain left lateral hip after sitting and getting up.  She feels better after she walks.  She has mild occasional pain standing and walking.  No awakening from sleep at night She has been doing physical therapy and has been improving  [] : Post Surgical Visit: no [FreeTextEntry1] : L Hip [de-identified] : 6/12/24 [de-identified] : Dr. Blair [de-identified] : MRI

## 2024-07-24 NOTE — IMAGING
[Bilateral] : hip with pelvis bilaterally [de-identified] : Normal gait  Lumbosacral spine Inspection-normal Tenderness-no tenderness Straight leg raising-negative bilaterally Motor exam lower extremities-normal  Right hip Full painless passive range of motion. No tenderness  Left hip No swelling Slight tenderness over the abductor insertion Full painless passive range of motion   Both legs No swelling Calves are soft and nontender Posterior tibial pulse 2+ bilaterally  [FreeTextEntry9] : Reviewed and interpreted.  Pelvis AP done November 22, 2023.  Well-circumscribed area of calcification/ossification left proximal femur, known enchondroma

## 2024-08-13 ENCOUNTER — APPOINTMENT (OUTPATIENT)
Dept: ORTHOPEDIC SURGERY | Facility: CLINIC | Age: 74
End: 2024-08-13

## 2024-08-15 ENCOUNTER — RESULT REVIEW (OUTPATIENT)
Age: 74
End: 2024-08-15

## 2024-08-15 ENCOUNTER — APPOINTMENT (OUTPATIENT)
Dept: CV DIAGNOSITCS | Facility: HOSPITAL | Age: 74
End: 2024-08-15

## 2024-09-16 ENCOUNTER — APPOINTMENT (OUTPATIENT)
Dept: UROLOGY | Facility: CLINIC | Age: 74
End: 2024-09-16
Payer: MEDICARE

## 2024-09-16 VITALS
SYSTOLIC BLOOD PRESSURE: 111 MMHG | HEART RATE: 60 BPM | TEMPERATURE: 98 F | DIASTOLIC BLOOD PRESSURE: 71 MMHG | OXYGEN SATURATION: 98 % | RESPIRATION RATE: 16 BRPM | BODY MASS INDEX: 20.2 KG/M2 | WEIGHT: 114 LBS | HEIGHT: 63 IN

## 2024-09-16 DIAGNOSIS — R33.9 RETENTION OF URINE, UNSPECIFIED: ICD-10-CM

## 2024-09-16 DIAGNOSIS — N36.8 OTHER SPECIFIED DISORDERS OF URETHRA: ICD-10-CM

## 2024-09-16 PROCEDURE — G2211 COMPLEX E/M VISIT ADD ON: CPT

## 2024-09-16 PROCEDURE — 99213 OFFICE O/P EST LOW 20 MIN: CPT

## 2024-09-16 NOTE — HISTORY OF PRESENT ILLNESS
[FreeTextEntry1] : She is a 74-year-old woman who is seen today in follow-up for urethral mucosal prolapse.  She does not have any new complaints.  In November 2023, urinalysis and urine culture were negative.  She underwent MRI for an unrelated reason which showed a distended bladder.  Residual urine today was about 400 mL as she had just voided. She is using Vagifem.  MRI in March 2023 showed normal kidneys which was done for assessment of the liver.  Previous note: She underwent excision of urethral prolapse in 2012. Pathology showed granulation tissue. Nocturia is 1-3 times.

## 2024-09-16 NOTE — ASSESSMENT
[FreeTextEntry1] : Her exam is unchanged.  She will continue to monitor.  She is not bothered by incomplete bladder emptying.  Urinalysis and urine culture were negative.  She may continue to monitor and follow-up in 1 year.

## 2024-09-16 NOTE — PHYSICAL EXAM
[External Female Genitalia] : normal external genitalia [FreeTextEntry1] : small urethral prolapse at 6 o'clock position without erythema, unchanged, MA in room.  [General Appearance - Well Developed] : well developed [General Appearance - Well Nourished] : well nourished [Normal Appearance] : normal appearance [Well Groomed] : well groomed [] : no respiratory distress [Abdomen Soft] : soft [Abdomen Tenderness] : non-tender [Normal Station and Gait] : the gait and station were normal for the patient's age

## 2024-09-25 ENCOUNTER — APPOINTMENT (OUTPATIENT)
Dept: ORTHOPEDIC SURGERY | Facility: CLINIC | Age: 74
End: 2024-09-25
Payer: MEDICARE

## 2024-09-25 VITALS — HEIGHT: 63 IN | WEIGHT: 114 LBS | BODY MASS INDEX: 20.2 KG/M2

## 2024-09-25 DIAGNOSIS — M70.62 TROCHANTERIC BURSITIS, LEFT HIP: ICD-10-CM

## 2024-09-25 DIAGNOSIS — M47.817 SPONDYLOSIS W/OUT MYELOPATHY OR RADICULOPATHY, LUMBOSACRAL REGION: ICD-10-CM

## 2024-09-25 DIAGNOSIS — S39.012D STRAIN OF MUSCLE, FASCIA AND TENDON OF LOWER BACK, SUBSEQUENT ENCOUNTER: ICD-10-CM

## 2024-09-25 PROCEDURE — 99214 OFFICE O/P EST MOD 30 MIN: CPT

## 2024-09-25 NOTE — HISTORY OF PRESENT ILLNESS
[Gradual] : gradual [3] : 3 [Dull/Aching] : dull/aching [Intermittent] : intermittent [Physical therapy] : physical therapy [Retired] : Work status: retired [de-identified] : The patient has continued mild pain in her lower back with change in position.  Pain bending and sitting.  Pain after sitting and getting up.  Pain radiates to her left leg when she lies down to sleep She has mild to moderate pain left lateral hip after sitting and getting up.  She feels better after she walks.  She has mild occasional pain standing and walking.  Occasional awakening from sleep She has been doing physical therapy and has been improving  [] : Post Surgical Visit: no [FreeTextEntry1] : L Hip [de-identified] : 7/24/24 [de-identified] : Dr. Blair [de-identified] : MRI

## 2024-09-25 NOTE — IMAGING
[Bilateral] : hip with pelvis bilaterally [FreeTextEntry9] : Reviewed and interpreted.  Pelvis AP done November 22, 2023.  Well-circumscribed area of calcification/ossification left proximal femur, known enchondroma [de-identified] : Normal gait  Lumbosacral spine Inspection-normal Tenderness-no tenderness Straight leg raising-negative bilaterally Motor exam lower extremities-normal  Right hip Full painless passive range of motion. No tenderness  Left hip No swelling Mild to moderate tenderness over the abductor insertion Full painless passive range of motion   Both legs No swelling Calves are soft and nontender Posterior tibial pulse 2+ bilaterally

## 2024-09-25 NOTE — DISCUSSION/SUMMARY
[Medication Risks Reviewed] : Medication risks reviewed [de-identified] : Tylenol as needed.  Unable to take NSAIDs because of sensitive stomach and history of ulcers I discussed possibly trying Medrol Dosepak Risk benefits and alternatives of prescribed medication(s) were discussed with the patient.  Side effects were discussed and questions were answered.   Continue PT at [facility] She spoke with her urologist Dr. Pimentel to see if any further evaluation is needed for urinary bladder abnormality noted on MRI.  No further evaluation was needed I discussed possible epidural injection as well as possible cortisone injection left hip She will have new MRI lumbosacral spine  Impression: Lumbosacral strain/spondylosis Abductor tendinitis left hip

## 2024-09-30 ENCOUNTER — APPOINTMENT (OUTPATIENT)
Dept: GERIATRICS | Facility: CLINIC | Age: 74
End: 2024-09-30
Payer: MEDICARE

## 2024-09-30 VITALS
RESPIRATION RATE: 14 BRPM | TEMPERATURE: 97.4 F | BODY MASS INDEX: 20.19 KG/M2 | OXYGEN SATURATION: 97 % | WEIGHT: 114 LBS | SYSTOLIC BLOOD PRESSURE: 105 MMHG | HEART RATE: 52 BPM | DIASTOLIC BLOOD PRESSURE: 57 MMHG

## 2024-09-30 DIAGNOSIS — D68.61 ANTIPHOSPHOLIPID SYNDROME: ICD-10-CM

## 2024-09-30 DIAGNOSIS — E78.5 HYPERLIPIDEMIA, UNSPECIFIED: ICD-10-CM

## 2024-09-30 DIAGNOSIS — Z23 ENCOUNTER FOR IMMUNIZATION: ICD-10-CM

## 2024-09-30 DIAGNOSIS — M85.80 OTHER SPECIFIED DISORDERS OF BONE DENSITY AND STRUCTURE, UNSPECIFIED SITE: ICD-10-CM

## 2024-09-30 DIAGNOSIS — Z00.00 ENCOUNTER FOR GENERAL ADULT MEDICAL EXAMINATION W/OUT ABNORMAL FINDINGS: ICD-10-CM

## 2024-09-30 DIAGNOSIS — M26.609 UNSPECIFIED TEMPOROMANDIBULAR JOINT DISORDER: ICD-10-CM

## 2024-09-30 PROCEDURE — G2211 COMPLEX E/M VISIT ADD ON: CPT

## 2024-09-30 PROCEDURE — 99205 OFFICE O/P NEW HI 60 MIN: CPT

## 2024-09-30 RX ORDER — FAMOTIDINE 20 MG/1
20 TABLET, FILM COATED ORAL
Qty: 30 | Refills: 0 | Status: ACTIVE | COMMUNITY
Start: 2024-09-30

## 2024-09-30 RX ORDER — OMEPRAZOLE 20 MG/1
20 TABLET, DELAYED RELEASE ORAL
Qty: 7 | Refills: 0 | Status: ACTIVE | COMMUNITY
Start: 2024-09-30

## 2024-09-30 RX ORDER — ASPIRIN 81 MG/1
81 TABLET, DELAYED RELEASE ORAL DAILY
Qty: 90 | Refills: 0 | Status: ACTIVE | COMMUNITY
Start: 2024-09-30

## 2024-09-30 RX ORDER — ROSUVASTATIN CALCIUM 20 MG/1
20 TABLET, FILM COATED ORAL
Qty: 1 | Refills: 0 | Status: ACTIVE | COMMUNITY
Start: 2024-09-30

## 2024-09-30 NOTE — ASSESSMENT
[FreeTextEntry1] : HM: Encouraged healthy low carb meals and snacks, and  physical activity as tolerated for weight maintenance. Vaccine information in chart. Prevnar 20 vaccine today.  Lab reviewed. Orders and referral provided as below.  Patient is here for episodic care. All patient questions answered today and understood by patient. Henceforth, Patient to schedule follow up if new symptoms, questions, renewals or health concerns.   I have spent a total of 60 minutes or greater to prepare to see and discuss with the patient, obtaining and reviewing history and records, documenting physical examination, explaining the diagnosis, prognosis and treatment plan with the patient. I also have spent the time interpreting results, medicine reconciliation, subspecialty consultation and documentation as above. Excluding time spent on separately billable services.

## 2024-09-30 NOTE — HISTORY OF PRESENT ILLNESS
[FreeTextEntry1] : Care team: PCP: Dr. Joslyn Vergara Cardiologist Dr. Kamila DUNN OSWALDO Ramirez Endo : Dr. Ariella Morin Phone [phone] Hematolgoist Dr. Dae Puri  Phone [phone] Allergist   Ms. EDUARDO UNGER is a 74 year old woman with pmhx of  of Anticardiolipin antibody syndrome/Lupus anticoagulant disorder on aspirin, GERD, hx of Gastric Ulcer on ppi, Pancreatic cyst (<1cm),  Migraines, Cardiomyopathy, HLD, DJD, hx of bursitis, hx of allergic rhinitis (s/p allergy shots), prediabetes, large pelvic cysts  who presents to establish care.   She follows with multiple specialist, past notes, imaging and labs were reviewed today with patient. Medications were reconciled. Advised against fish oils due to risk for abnormal heart rhythms. Patient to eat for more fish.  Patient walks regularly.   She complains of R tmj discomfort, she wants to retry PT for this.   Patient denied any other symptoms or complaints today.  [One fall no injury in past year] : Patient reported one fall in the past year without injury [Completely Independent] : Completely independent. [Smoke Detector] : smoke detector [NO] : No [0] : 2) Feeling down, depressed, or hopeless: Not at all (0) [PHQ-2 Negative - No further assessment needed] : PHQ-2 Negative - No further assessment needed [NLX5Nmmcl] : 0 [With Patient/Caregiver] : , with patient/caregiver [AdvancecareDate] : 09/24 [FreeTextEntry4] : HCP to be faxed over to add to chart.

## 2024-09-30 NOTE — PHYSICAL EXAM
[Alert] : alert [No Acute Distress] : in no acute distress [Sclera] : the sclera and conjunctiva were normal [EOMI] : extraocular movements were intact [Supple] : the neck was supple [No Respiratory Distress] : no respiratory distress [No Acc Muscle Use] : no accessory muscle use [Auscultation Breath Sounds / Voice Sounds] : lungs were clear to auscultation bilaterally [Normal S1, S2] : normal S1 and S2 [Heart Rate And Rhythm] : heart rate was normal and rhythm regular [Bowel Sounds] : normal bowel sounds [Abdomen Tenderness] : non-tender [Abdomen Soft] : soft [No Spinal Tenderness] : no spinal tenderness [Normal Gait] : normal gait [No Clubbing, Cyanosis] : no clubbing or cyanosis of the fingernails [Motor Tone] : muscle strength and tone were normal [No Focal Deficits] : no focal deficits [Normal Affect] : the affect was normal [Normal Mood] : the mood was normal

## 2024-09-30 NOTE — HISTORY OF PRESENT ILLNESS
[FreeTextEntry1] : Care team: PCP: Dr. Joslyn Vergara Cardiologist Dr. Kamila DUNN OSWALDO Ramirez Endo : Dr. Ariella Morin Phone [phone] Hemat[address]goist Dr. Dae Puri  Phone [phone] Allergist   Ms. EDUARDO UNGER is a 74 year old woman with pmhx of  of Anticardiolipin antibody syndrome/Lupus anticoagulant disorder on aspirin, GERD, hx of Gastric Ulcer on ppi, Pancreatic cyst (<1cm),  Migraines, Cardiomyopathy, HLD, DJD, hx of bursitis, hx of allergic rhinitis (s/p allergy shots), prediabetes, large pelvic cysts  who presents to establish care.   She follows with multiple specialist, past notes, imaging and labs were reviewed today with patient. Medications were reconciled. Advised against fish oils due to risk for abnormal heart rhythms. Patient to eat for more fish.  Patient walks regularly.   She complains of R tmj discomfort, she wants to retry PT for this.   Patient denied any other symptoms or complaints today.  [One fall no injury in past year] : Patient reported one fall in the past year without injury [Completely Independent] : Completely independent. [Smoke Detector] : smoke detector [NO] : No [0] : 2) Feeling down, depressed, or hopeless: Not at all (0) [PHQ-2 Negative - No further assessment needed] : PHQ-2 Negative - No further assessment needed [[id number]] : 0 [With Patient/Caregiver] : , with patient/caregiver [AdvancecareDate] : 09/24 [FreeTextEntry4] : HCP to be faxed over to add to chart.

## 2024-10-07 ENCOUNTER — APPOINTMENT (OUTPATIENT)
Dept: CARDIOLOGY | Facility: CLINIC | Age: 74
End: 2024-10-07
Payer: MEDICARE

## 2024-10-07 VITALS
BODY MASS INDEX: 20.55 KG/M2 | HEART RATE: 57 BPM | OXYGEN SATURATION: 99 % | DIASTOLIC BLOOD PRESSURE: 61 MMHG | SYSTOLIC BLOOD PRESSURE: 96 MMHG | HEIGHT: 63 IN | WEIGHT: 116 LBS

## 2024-10-07 DIAGNOSIS — R00.2 PALPITATIONS: ICD-10-CM

## 2024-10-07 DIAGNOSIS — I42.2 OTHER HYPERTROPHIC CARDIOMYOPATHY: ICD-10-CM

## 2024-10-07 DIAGNOSIS — R42 DIZZINESS AND GIDDINESS: ICD-10-CM

## 2024-10-07 PROCEDURE — 99214 OFFICE O/P EST MOD 30 MIN: CPT

## 2024-10-07 PROCEDURE — 93000 ELECTROCARDIOGRAM COMPLETE: CPT

## 2024-10-09 ENCOUNTER — APPOINTMENT (OUTPATIENT)
Dept: MRI IMAGING | Facility: CLINIC | Age: 74
End: 2024-10-09

## 2024-10-09 PROCEDURE — 72148 MRI LUMBAR SPINE W/O DYE: CPT

## 2024-10-15 ENCOUNTER — TRANSCRIPTION ENCOUNTER (OUTPATIENT)
Age: 74
End: 2024-10-15

## 2024-10-16 ENCOUNTER — TRANSCRIPTION ENCOUNTER (OUTPATIENT)
Age: 74
End: 2024-10-16

## 2024-10-30 ENCOUNTER — APPOINTMENT (OUTPATIENT)
Dept: ORTHOPEDIC SURGERY | Facility: CLINIC | Age: 74
End: 2024-10-30
Payer: MEDICARE

## 2024-10-30 VITALS — HEIGHT: 63 IN | BODY MASS INDEX: 20.55 KG/M2 | WEIGHT: 116 LBS

## 2024-10-30 DIAGNOSIS — M47.817 SPONDYLOSIS W/OUT MYELOPATHY OR RADICULOPATHY, LUMBOSACRAL REGION: ICD-10-CM

## 2024-10-30 DIAGNOSIS — M70.62 TROCHANTERIC BURSITIS, LEFT HIP: ICD-10-CM

## 2024-10-30 DIAGNOSIS — S39.012D STRAIN OF MUSCLE, FASCIA AND TENDON OF LOWER BACK, SUBSEQUENT ENCOUNTER: ICD-10-CM

## 2024-10-30 DIAGNOSIS — M16.12 UNILATERAL PRIMARY OSTEOARTHRITIS, LEFT HIP: ICD-10-CM

## 2024-10-30 PROCEDURE — 99214 OFFICE O/P EST MOD 30 MIN: CPT

## 2024-10-31 DIAGNOSIS — D17.71 BENIGN LIPOMATOUS NEOPLASM OF KIDNEY: ICD-10-CM

## 2024-12-02 ENCOUNTER — APPOINTMENT (OUTPATIENT)
Dept: ULTRASOUND IMAGING | Facility: CLINIC | Age: 74
End: 2024-12-02
Payer: MEDICARE

## 2024-12-02 ENCOUNTER — OUTPATIENT (OUTPATIENT)
Dept: OUTPATIENT SERVICES | Facility: HOSPITAL | Age: 74
LOS: 1 days | End: 2024-12-02
Payer: MEDICARE

## 2024-12-02 DIAGNOSIS — Z00.00 ENCOUNTER FOR GENERAL ADULT MEDICAL EXAMINATION WITHOUT ABNORMAL FINDINGS: ICD-10-CM

## 2024-12-02 DIAGNOSIS — M19.012 PRIMARY OSTEOARTHRITIS, LEFT SHOULDER: Chronic | ICD-10-CM

## 2024-12-02 PROCEDURE — 76775 US EXAM ABDO BACK WALL LIM: CPT

## 2024-12-02 PROCEDURE — 76775 US EXAM ABDO BACK WALL LIM: CPT | Mod: 26

## 2024-12-13 ENCOUNTER — APPOINTMENT (OUTPATIENT)
Dept: MRI IMAGING | Facility: CLINIC | Age: 74
End: 2024-12-13

## 2024-12-13 ENCOUNTER — OUTPATIENT (OUTPATIENT)
Dept: OUTPATIENT SERVICES | Facility: HOSPITAL | Age: 74
LOS: 1 days | End: 2024-12-13
Payer: MEDICARE

## 2024-12-13 DIAGNOSIS — R94.31 ABNORMAL ELECTROCARDIOGRAM [ECG] [EKG]: ICD-10-CM

## 2024-12-13 DIAGNOSIS — I42.2 OTHER HYPERTROPHIC CARDIOMYOPATHY: ICD-10-CM

## 2024-12-13 DIAGNOSIS — M19.012 PRIMARY OSTEOARTHRITIS, LEFT SHOULDER: Chronic | ICD-10-CM

## 2024-12-13 PROCEDURE — 75561 CARDIAC MRI FOR MORPH W/DYE: CPT

## 2024-12-13 PROCEDURE — 75565 CARD MRI VELOC FLOW MAPPING: CPT | Mod: MH

## 2024-12-13 PROCEDURE — 75561 CARDIAC MRI FOR MORPH W/DYE: CPT | Mod: 26,MH

## 2024-12-13 PROCEDURE — A9585: CPT

## 2024-12-13 PROCEDURE — 75565 CARD MRI VELOC FLOW MAPPING: CPT | Mod: 26,MH

## 2025-01-06 ENCOUNTER — NON-APPOINTMENT (OUTPATIENT)
Age: 75
End: 2025-01-06

## 2025-01-06 ENCOUNTER — APPOINTMENT (OUTPATIENT)
Dept: CARDIOLOGY | Facility: CLINIC | Age: 75
End: 2025-01-06
Payer: MEDICARE

## 2025-01-06 VITALS
DIASTOLIC BLOOD PRESSURE: 76 MMHG | HEIGHT: 63 IN | HEART RATE: 54 BPM | SYSTOLIC BLOOD PRESSURE: 133 MMHG | WEIGHT: 115 LBS | BODY MASS INDEX: 20.38 KG/M2 | OXYGEN SATURATION: 98 %

## 2025-01-06 DIAGNOSIS — R42 DIZZINESS AND GIDDINESS: ICD-10-CM

## 2025-01-06 DIAGNOSIS — R06.00 DYSPNEA, UNSPECIFIED: ICD-10-CM

## 2025-01-06 PROCEDURE — 93000 ELECTROCARDIOGRAM COMPLETE: CPT

## 2025-01-06 PROCEDURE — 99214 OFFICE O/P EST MOD 30 MIN: CPT

## 2025-01-07 ENCOUNTER — APPOINTMENT (OUTPATIENT)
Dept: OPHTHALMOLOGY | Facility: CLINIC | Age: 75
End: 2025-01-07
Payer: MEDICARE

## 2025-01-07 ENCOUNTER — NON-APPOINTMENT (OUTPATIENT)
Age: 75
End: 2025-01-07

## 2025-01-07 PROCEDURE — 92014 COMPRE OPH EXAM EST PT 1/>: CPT

## 2025-01-08 ENCOUNTER — APPOINTMENT (OUTPATIENT)
Dept: ORTHOPEDIC SURGERY | Facility: CLINIC | Age: 75
End: 2025-01-08
Payer: MEDICARE

## 2025-01-08 VITALS — HEIGHT: 63 IN | WEIGHT: 115 LBS | BODY MASS INDEX: 20.38 KG/M2

## 2025-01-08 DIAGNOSIS — M70.62 TROCHANTERIC BURSITIS, LEFT HIP: ICD-10-CM

## 2025-01-08 DIAGNOSIS — M47.817 SPONDYLOSIS W/OUT MYELOPATHY OR RADICULOPATHY, LUMBOSACRAL REGION: ICD-10-CM

## 2025-01-08 DIAGNOSIS — M19.011 PRIMARY OSTEOARTHRITIS, RIGHT SHOULDER: ICD-10-CM

## 2025-01-08 DIAGNOSIS — S39.012D STRAIN OF MUSCLE, FASCIA AND TENDON OF LOWER BACK, SUBSEQUENT ENCOUNTER: ICD-10-CM

## 2025-01-08 PROCEDURE — 73030 X-RAY EXAM OF SHOULDER: CPT | Mod: RT

## 2025-01-08 PROCEDURE — 99214 OFFICE O/P EST MOD 30 MIN: CPT

## 2025-01-08 PROCEDURE — 72100 X-RAY EXAM L-S SPINE 2/3 VWS: CPT

## 2025-01-08 PROCEDURE — 73502 X-RAY EXAM HIP UNI 2-3 VIEWS: CPT

## 2025-01-13 ENCOUNTER — APPOINTMENT (OUTPATIENT)
Dept: CT IMAGING | Facility: CLINIC | Age: 75
End: 2025-01-13
Payer: MEDICARE

## 2025-01-13 ENCOUNTER — OUTPATIENT (OUTPATIENT)
Dept: OUTPATIENT SERVICES | Facility: HOSPITAL | Age: 75
LOS: 1 days | End: 2025-01-13
Payer: MEDICARE

## 2025-01-13 DIAGNOSIS — R19.00 INTRA-ABDOMINAL AND PELVIC SWELLING, MASS AND LUMP, UNSPECIFIED SITE: ICD-10-CM

## 2025-01-13 DIAGNOSIS — M19.012 PRIMARY OSTEOARTHRITIS, LEFT SHOULDER: Chronic | ICD-10-CM

## 2025-01-13 PROCEDURE — 72193 CT PELVIS W/DYE: CPT

## 2025-01-13 PROCEDURE — 72193 CT PELVIS W/DYE: CPT | Mod: 26

## 2025-01-15 ENCOUNTER — NON-APPOINTMENT (OUTPATIENT)
Age: 75
End: 2025-01-15

## 2025-01-16 ENCOUNTER — RX RENEWAL (OUTPATIENT)
Age: 75
End: 2025-01-16

## 2025-01-28 ENCOUNTER — TRANSCRIPTION ENCOUNTER (OUTPATIENT)
Age: 75
End: 2025-01-28

## 2025-02-19 ENCOUNTER — APPOINTMENT (OUTPATIENT)
Dept: CT IMAGING | Facility: CLINIC | Age: 75
End: 2025-02-19
Payer: MEDICARE

## 2025-02-19 PROCEDURE — 75574 CT ANGIO HRT W/3D IMAGE: CPT

## 2025-02-24 ENCOUNTER — TRANSCRIPTION ENCOUNTER (OUTPATIENT)
Age: 75
End: 2025-02-24

## 2025-02-25 ENCOUNTER — TRANSCRIPTION ENCOUNTER (OUTPATIENT)
Age: 75
End: 2025-02-25

## 2025-03-13 ENCOUNTER — TRANSCRIPTION ENCOUNTER (OUTPATIENT)
Age: 75
End: 2025-03-13

## 2025-03-17 ENCOUNTER — APPOINTMENT (OUTPATIENT)
Dept: GERIATRICS | Facility: CLINIC | Age: 75
End: 2025-03-17
Payer: MEDICARE

## 2025-03-17 VITALS
TEMPERATURE: 98.6 F | SYSTOLIC BLOOD PRESSURE: 120 MMHG | RESPIRATION RATE: 16 BRPM | HEART RATE: 58 BPM | WEIGHT: 114.5 LBS | HEIGHT: 63 IN | DIASTOLIC BLOOD PRESSURE: 69 MMHG | BODY MASS INDEX: 20.29 KG/M2 | OXYGEN SATURATION: 96 %

## 2025-03-17 DIAGNOSIS — D68.61 ANTIPHOSPHOLIPID SYNDROME: ICD-10-CM

## 2025-03-17 DIAGNOSIS — R00.1 BRADYCARDIA, UNSPECIFIED: ICD-10-CM

## 2025-03-17 DIAGNOSIS — Z87.39 PERSONAL HISTORY OF OTHER DISEASES OF THE MUSCULOSKELETAL SYSTEM AND CONNECTIVE TISSUE: ICD-10-CM

## 2025-03-17 DIAGNOSIS — E78.5 HYPERLIPIDEMIA, UNSPECIFIED: ICD-10-CM

## 2025-03-17 DIAGNOSIS — I42.2 OTHER HYPERTROPHIC CARDIOMYOPATHY: ICD-10-CM

## 2025-03-17 DIAGNOSIS — D68.62 LUPUS ANTICOAGULANT SYNDROME: ICD-10-CM

## 2025-03-17 DIAGNOSIS — R42 DIZZINESS AND GIDDINESS: ICD-10-CM

## 2025-03-17 DIAGNOSIS — D17.71 BENIGN LIPOMATOUS NEOPLASM OF KIDNEY: ICD-10-CM

## 2025-03-17 PROCEDURE — 99214 OFFICE O/P EST MOD 30 MIN: CPT

## 2025-03-18 ENCOUNTER — TRANSCRIPTION ENCOUNTER (OUTPATIENT)
Age: 75
End: 2025-03-18

## 2025-03-19 ENCOUNTER — LABORATORY RESULT (OUTPATIENT)
Age: 75
End: 2025-03-19

## 2025-03-19 PROBLEM — R00.1 SINUS BRADYCARDIA: Status: ACTIVE | Noted: 2025-03-17

## 2025-03-19 PROBLEM — Z87.39 HISTORY OF TEMPOROMANDIBULAR JOINT DISORDER: Status: RESOLVED | Noted: 2022-09-16 | Resolved: 2025-03-19

## 2025-03-26 ENCOUNTER — APPOINTMENT (OUTPATIENT)
Dept: ORTHOPEDIC SURGERY | Facility: CLINIC | Age: 75
End: 2025-03-26
Payer: MEDICARE

## 2025-03-26 VITALS — WEIGHT: 114 LBS | BODY MASS INDEX: 20.2 KG/M2 | HEIGHT: 63 IN

## 2025-03-26 DIAGNOSIS — M19.011 PRIMARY OSTEOARTHRITIS, RIGHT SHOULDER: ICD-10-CM

## 2025-03-26 DIAGNOSIS — M70.62 TROCHANTERIC BURSITIS, LEFT HIP: ICD-10-CM

## 2025-03-26 DIAGNOSIS — S39.012D STRAIN OF MUSCLE, FASCIA AND TENDON OF LOWER BACK, SUBSEQUENT ENCOUNTER: ICD-10-CM

## 2025-03-26 DIAGNOSIS — M47.817 SPONDYLOSIS W/OUT MYELOPATHY OR RADICULOPATHY, LUMBOSACRAL REGION: ICD-10-CM

## 2025-03-26 DIAGNOSIS — D47.2 MONOCLONAL GAMMOPATHY: ICD-10-CM

## 2025-03-26 LAB
ALBUMIN MFR SERPL ELPH: 63.7 %
ALBUMIN SERPL ELPH-MCNC: 4.2 G/DL
ALBUMIN SERPL-MCNC: 4.1 G/DL
ALBUMIN/GLOB SERPL: 1.7 RATIO
ALP BLD-CCNC: 47 U/L
ALPHA1 GLOB MFR SERPL ELPH: 3.5 %
ALPHA1 GLOB SERPL ELPH-MCNC: 0.2 G/DL
ALPHA2 GLOB MFR SERPL ELPH: 6.9 %
ALPHA2 GLOB SERPL ELPH-MCNC: 0.4 G/DL
ALT SERPL-CCNC: 15 U/L
ANION GAP SERPL CALC-SCNC: 9 MMOL/L
AST SERPL-CCNC: 22 U/L
B-GLOBULIN MFR SERPL ELPH: 9.4 %
B-GLOBULIN SERPL ELPH-MCNC: 0.6 G/DL
BASOPHILS # BLD AUTO: 0.07 K/UL
BASOPHILS NFR BLD AUTO: 1.6 %
BILIRUB SERPL-MCNC: 0.3 MG/DL
BUN SERPL-MCNC: 20 MG/DL
CALCIUM SERPL-MCNC: 9.3 MG/DL
CHLORIDE SERPL-SCNC: 106 MMOL/L
CHOLEST SERPL-MCNC: 158 MG/DL
CO2 SERPL-SCNC: 28 MMOL/L
CREAT SERPL-MCNC: 0.85 MG/DL
EGFRCR SERPLBLD CKD-EPI 2021: 71 ML/MIN/1.73M2
EOSINOPHIL # BLD AUTO: 0.2 K/UL
EOSINOPHIL NFR BLD AUTO: 4.7 %
GAMMA GLOB FLD ELPH-MCNC: 1.1 G/DL
GAMMA GLOB MFR SERPL ELPH: 16.5 %
GLUCOSE SERPL-MCNC: 93 MG/DL
HCT VFR BLD CALC: 36.8 %
HDLC SERPL-MCNC: 58 MG/DL
HGB BLD-MCNC: 12.1 G/DL
IMM GRANULOCYTES NFR BLD AUTO: 0.2 %
INTERPRETATION SERPL IEP-IMP: NORMAL
LDLC SERPL-MCNC: 92 MG/DL
LYMPHOCYTES # BLD AUTO: 1.25 K/UL
LYMPHOCYTES NFR BLD AUTO: 29.1 %
M PROTEIN MFR SERPL ELPH: NORMAL
MAN DIFF?: NORMAL
MCHC RBC-ENTMCNC: 30.6 PG
MCHC RBC-ENTMCNC: 32.9 G/DL
MCV RBC AUTO: 92.9 FL
MONOCLON BAND OBS SERPL: NORMAL
MONOCYTES # BLD AUTO: 0.43 K/UL
MONOCYTES NFR BLD AUTO: 10 %
NEUTROPHILS # BLD AUTO: 2.34 K/UL
NEUTROPHILS NFR BLD AUTO: 54.4 %
NONHDLC SERPL-MCNC: 100 MG/DL
PLATELET # BLD AUTO: 251 K/UL
POTASSIUM SERPL-SCNC: 4.9 MMOL/L
PROT SERPL-MCNC: 6.5 G/DL
RBC # BLD: 3.96 M/UL
RBC # FLD: 13.2 %
SODIUM SERPL-SCNC: 143 MMOL/L
T4 FREE SERPL-MCNC: 1 NG/DL
TRIGL SERPL-MCNC: 37 MG/DL
TSH SERPL-ACNC: 2.31 UIU/ML
WBC # FLD AUTO: 4.3 K/UL

## 2025-03-26 PROCEDURE — 99214 OFFICE O/P EST MOD 30 MIN: CPT

## 2025-03-28 ENCOUNTER — NON-APPOINTMENT (OUTPATIENT)
Age: 75
End: 2025-03-28

## 2025-04-02 DIAGNOSIS — D47.2 MONOCLONAL GAMMOPATHY: ICD-10-CM

## 2025-04-03 ENCOUNTER — NON-APPOINTMENT (OUTPATIENT)
Age: 75
End: 2025-04-03

## 2025-04-03 ENCOUNTER — APPOINTMENT (OUTPATIENT)
Dept: ELECTROPHYSIOLOGY | Facility: CLINIC | Age: 75
End: 2025-04-03
Payer: MEDICARE

## 2025-04-03 VITALS
HEIGHT: 63 IN | WEIGHT: 112 LBS | OXYGEN SATURATION: 97 % | DIASTOLIC BLOOD PRESSURE: 64 MMHG | SYSTOLIC BLOOD PRESSURE: 100 MMHG | HEART RATE: 59 BPM | BODY MASS INDEX: 19.84 KG/M2

## 2025-04-03 DIAGNOSIS — I47.10 SUPRAVENTRICULAR TACHYCARDIA, UNSPECIFIED: ICD-10-CM

## 2025-04-03 DIAGNOSIS — R42 DIZZINESS AND GIDDINESS: ICD-10-CM

## 2025-04-03 DIAGNOSIS — I42.2 OTHER HYPERTROPHIC CARDIOMYOPATHY: ICD-10-CM

## 2025-04-03 PROCEDURE — 99204 OFFICE O/P NEW MOD 45 MIN: CPT

## 2025-04-03 PROCEDURE — 93000 ELECTROCARDIOGRAM COMPLETE: CPT

## 2025-04-04 PROBLEM — I47.10 PAROXYSMAL SVT (SUPRAVENTRICULAR TACHYCARDIA): Status: ACTIVE | Noted: 2025-04-04

## 2025-04-11 ENCOUNTER — NON-APPOINTMENT (OUTPATIENT)
Age: 75
End: 2025-04-11

## 2025-04-11 ENCOUNTER — APPOINTMENT (OUTPATIENT)
Dept: CARDIOLOGY | Facility: CLINIC | Age: 75
End: 2025-04-11
Payer: MEDICARE

## 2025-04-11 VITALS
BODY MASS INDEX: 19.84 KG/M2 | DIASTOLIC BLOOD PRESSURE: 76 MMHG | HEART RATE: 53 BPM | SYSTOLIC BLOOD PRESSURE: 122 MMHG | WEIGHT: 112 LBS | HEIGHT: 63 IN | OXYGEN SATURATION: 98 %

## 2025-04-11 DIAGNOSIS — R00.2 PALPITATIONS: ICD-10-CM

## 2025-04-11 PROCEDURE — 99214 OFFICE O/P EST MOD 30 MIN: CPT

## 2025-04-11 PROCEDURE — 93000 ELECTROCARDIOGRAM COMPLETE: CPT

## 2025-04-16 ENCOUNTER — APPOINTMENT (OUTPATIENT)
Dept: ORTHOPEDIC SURGERY | Facility: CLINIC | Age: 75
End: 2025-04-16
Payer: MEDICARE

## 2025-04-16 VITALS — WEIGHT: 114 LBS | BODY MASS INDEX: 20.45 KG/M2 | HEIGHT: 62.5 IN

## 2025-04-16 DIAGNOSIS — E78.00 PURE HYPERCHOLESTEROLEMIA, UNSPECIFIED: ICD-10-CM

## 2025-04-16 DIAGNOSIS — M19.011 PRIMARY OSTEOARTHRITIS, RIGHT SHOULDER: ICD-10-CM

## 2025-04-16 PROCEDURE — 73010 X-RAY EXAM OF SHOULDER BLADE: CPT | Mod: RT

## 2025-04-16 PROCEDURE — 99214 OFFICE O/P EST MOD 30 MIN: CPT

## 2025-04-23 ENCOUNTER — NON-APPOINTMENT (OUTPATIENT)
Age: 75
End: 2025-04-23

## 2025-05-05 ENCOUNTER — TRANSCRIPTION ENCOUNTER (OUTPATIENT)
Age: 75
End: 2025-05-05

## 2025-05-07 ENCOUNTER — APPOINTMENT (OUTPATIENT)
Dept: ORTHOPEDIC SURGERY | Facility: CLINIC | Age: 75
End: 2025-05-07
Payer: MEDICARE

## 2025-05-07 PROCEDURE — 20611 DRAIN/INJ JOINT/BURSA W/US: CPT | Mod: RT

## 2025-05-07 RX ORDER — HYALURONATE SODIUM 10 MG/ML
25 SYRINGE (ML) INTRAARTICULAR
Refills: 0 | Status: COMPLETED | OUTPATIENT
Start: 2025-05-07

## 2025-05-07 RX ADMIN — Medication MG/2.5ML: at 00:00

## 2025-05-13 NOTE — PACU DISCHARGE NOTE - NAUSEA/VOMITING:
Patient presents with worsening abdominal pain with associated nausea, vomiting, and altered mentation.  Lipase >1000.  CT with pancreatic head inflammation concerning for pancreatitis.  Patient denies alcohol use or any new medications.  No gallstones noted on imaging. Unclear etiology of pancreatitis at this time.  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    Plan:  -Ordered lipid panel. TG wnl/not cause of pancreatitis  -received aggressive IV fluids with improvement   -continue supportive care, and pain control  -diet advanced as tolerated, currently tolerating regular diet     None

## 2025-05-14 ENCOUNTER — APPOINTMENT (OUTPATIENT)
Dept: ORTHOPEDIC SURGERY | Facility: CLINIC | Age: 75
End: 2025-05-14
Payer: MEDICARE

## 2025-05-14 VITALS — HEIGHT: 62.5 IN | WEIGHT: 114 LBS | BODY MASS INDEX: 20.45 KG/M2

## 2025-05-14 VITALS — BODY MASS INDEX: 20.45 KG/M2 | HEIGHT: 62.5 IN | WEIGHT: 114 LBS

## 2025-05-14 PROCEDURE — 20611 DRAIN/INJ JOINT/BURSA W/US: CPT | Mod: RT

## 2025-05-14 RX ORDER — HYALURONATE SODIUM 10 MG/ML
25 SYRINGE (ML) INTRAARTICULAR
Refills: 0 | Status: COMPLETED | OUTPATIENT
Start: 2025-05-14

## 2025-05-14 RX ADMIN — Medication MG/2.5ML: at 00:00

## 2025-05-21 ENCOUNTER — APPOINTMENT (OUTPATIENT)
Dept: CARDIOLOGY | Facility: CLINIC | Age: 75
End: 2025-05-21
Payer: MEDICARE

## 2025-05-21 ENCOUNTER — APPOINTMENT (OUTPATIENT)
Dept: ORTHOPEDIC SURGERY | Facility: CLINIC | Age: 75
End: 2025-05-21
Payer: MEDICARE

## 2025-05-21 VITALS — HEIGHT: 62.5 IN | WEIGHT: 114 LBS | BODY MASS INDEX: 20.45 KG/M2

## 2025-05-21 DIAGNOSIS — M19.011 PRIMARY OSTEOARTHRITIS, RIGHT SHOULDER: ICD-10-CM

## 2025-05-21 PROCEDURE — 93306 TTE W/DOPPLER COMPLETE: CPT

## 2025-05-21 PROCEDURE — 20611 DRAIN/INJ JOINT/BURSA W/US: CPT | Mod: RT

## 2025-05-21 RX ORDER — HYALURONATE SODIUM 10 MG/ML
25 SYRINGE (ML) INTRAARTICULAR
Refills: 0 | Status: COMPLETED | OUTPATIENT
Start: 2025-05-21

## 2025-05-21 RX ADMIN — Medication MG/2.5ML: at 00:00

## 2025-05-28 ENCOUNTER — APPOINTMENT (OUTPATIENT)
Dept: ORTHOPEDIC SURGERY | Facility: CLINIC | Age: 75
End: 2025-05-28

## 2025-07-07 ENCOUNTER — APPOINTMENT (OUTPATIENT)
Dept: CARDIOLOGY | Facility: CLINIC | Age: 75
End: 2025-07-07
Payer: MEDICARE

## 2025-07-07 VITALS
OXYGEN SATURATION: 98 % | WEIGHT: 114 LBS | DIASTOLIC BLOOD PRESSURE: 62 MMHG | HEART RATE: 58 BPM | BODY MASS INDEX: 20.45 KG/M2 | HEIGHT: 62.5 IN | SYSTOLIC BLOOD PRESSURE: 99 MMHG

## 2025-07-07 PROBLEM — I25.10 CAD (CORONARY ARTERY DISEASE): Status: ACTIVE | Noted: 2025-07-07

## 2025-07-07 PROCEDURE — 99214 OFFICE O/P EST MOD 30 MIN: CPT

## 2025-07-07 PROCEDURE — 93000 ELECTROCARDIOGRAM COMPLETE: CPT

## 2025-07-10 LAB
ALBUMIN SERPL ELPH-MCNC: 4.4 G/DL
ALP BLD-CCNC: 45 U/L
ALT SERPL-CCNC: 21 U/L
ANION GAP SERPL CALC-SCNC: 9 MMOL/L
AST SERPL-CCNC: 29 U/L
BILIRUB SERPL-MCNC: 0.3 MG/DL
BUN SERPL-MCNC: 17 MG/DL
CALCIUM SERPL-MCNC: 9.5 MG/DL
CHLORIDE SERPL-SCNC: 106 MMOL/L
CHOLEST SERPL-MCNC: 149 MG/DL
CO2 SERPL-SCNC: 26 MMOL/L
CREAT SERPL-MCNC: 0.71 MG/DL
EGFRCR SERPLBLD CKD-EPI 2021: 89 ML/MIN/1.73M2
ESTIMATED AVERAGE GLUCOSE: 117 MG/DL
GLUCOSE SERPL-MCNC: 86 MG/DL
HBA1C MFR BLD HPLC: 5.7 %
HDLC SERPL-MCNC: 65 MG/DL
LDLC SERPL-MCNC: 75 MG/DL
NONHDLC SERPL-MCNC: 84 MG/DL
POTASSIUM SERPL-SCNC: 4.6 MMOL/L
PROT SERPL-MCNC: 6.8 G/DL
SODIUM SERPL-SCNC: 141 MMOL/L
TRIGL SERPL-MCNC: 40 MG/DL

## 2025-07-15 ENCOUNTER — TRANSCRIPTION ENCOUNTER (OUTPATIENT)
Age: 75
End: 2025-07-15

## 2025-07-16 ENCOUNTER — OUTPATIENT (OUTPATIENT)
Dept: OUTPATIENT SERVICES | Facility: HOSPITAL | Age: 75
LOS: 1 days | End: 2025-07-16
Payer: MEDICARE

## 2025-07-16 ENCOUNTER — APPOINTMENT (OUTPATIENT)
Dept: ORTHOPEDIC SURGERY | Facility: CLINIC | Age: 75
End: 2025-07-16
Payer: MEDICARE

## 2025-07-16 ENCOUNTER — APPOINTMENT (OUTPATIENT)
Dept: CT IMAGING | Facility: CLINIC | Age: 75
End: 2025-07-16
Payer: MEDICARE

## 2025-07-16 VITALS — WEIGHT: 114 LBS | HEIGHT: 62.5 IN | BODY MASS INDEX: 20.45 KG/M2

## 2025-07-16 DIAGNOSIS — M19.012 PRIMARY OSTEOARTHRITIS, LEFT SHOULDER: Chronic | ICD-10-CM

## 2025-07-16 DIAGNOSIS — R18.8 OTHER ASCITES: ICD-10-CM

## 2025-07-16 PROCEDURE — 99213 OFFICE O/P EST LOW 20 MIN: CPT

## 2025-07-16 PROCEDURE — 72193 CT PELVIS W/DYE: CPT | Mod: 26

## 2025-07-16 PROCEDURE — 72193 CT PELVIS W/DYE: CPT

## 2025-07-18 NOTE — ASU PATIENT PROFILE, ADULT - NSICDXPASTSURGICALHX_GEN_ALL_CORE_FT
Called the patient and let him know of the supplies sent in. He stated he already picked it up.    PAST SURGICAL HISTORY:  H/O  section x 2 ,     H/O hysterectomy for benign disease 2002    Osteoarthritis of left shoulder     S/P D&C many yrs ago    S/P tonsillectomy 60yrs ago

## 2025-09-19 ENCOUNTER — APPOINTMENT (OUTPATIENT)
Dept: UROLOGY | Facility: CLINIC | Age: 75
End: 2025-09-19
Payer: MEDICARE

## 2025-09-19 VITALS
HEIGHT: 62.5 IN | RESPIRATION RATE: 16 BRPM | BODY MASS INDEX: 20.45 KG/M2 | DIASTOLIC BLOOD PRESSURE: 66 MMHG | OXYGEN SATURATION: 98 % | SYSTOLIC BLOOD PRESSURE: 105 MMHG | WEIGHT: 114 LBS | HEART RATE: 56 BPM

## 2025-09-19 DIAGNOSIS — R19.00 INTRA-ABDOMINAL AND PELVIC SWELLING, MASS AND LUMP, UNSPECIFIED SITE: ICD-10-CM

## 2025-09-19 DIAGNOSIS — N36.8 OTHER SPECIFIED DISORDERS OF URETHRA: ICD-10-CM

## 2025-09-19 PROCEDURE — G2211 COMPLEX E/M VISIT ADD ON: CPT

## 2025-09-19 PROCEDURE — 99214 OFFICE O/P EST MOD 30 MIN: CPT

## 2025-09-21 LAB
APPEARANCE: CLEAR
BACTERIA UR CULT: NORMAL
BACTERIA: NEGATIVE /HPF
BILIRUBIN URINE: NEGATIVE
BLOOD URINE: ABNORMAL
CAST: 2 /LPF
COLOR: YELLOW
EPITHELIAL CELLS: 6 /HPF
GLUCOSE QUALITATIVE U: NEGATIVE MG/DL
KETONES URINE: NEGATIVE MG/DL
LEUKOCYTE ESTERASE URINE: ABNORMAL
MICROSCOPIC-UA: NORMAL
NITRITE URINE: NEGATIVE
PH URINE: 7.5
PROTEIN URINE: NORMAL MG/DL
RED BLOOD CELLS URINE: 4 /HPF
SPECIFIC GRAVITY URINE: 1.02
UROBILINOGEN URINE: 0.2 MG/DL
WHITE BLOOD CELLS URINE: 8 /HPF